# Patient Record
Sex: FEMALE | Race: WHITE
[De-identification: names, ages, dates, MRNs, and addresses within clinical notes are randomized per-mention and may not be internally consistent; named-entity substitution may affect disease eponyms.]

---

## 2018-11-07 ENCOUNTER — HOSPITAL ENCOUNTER (INPATIENT)
Dept: HOSPITAL 63 - GEROPSY | Age: 73
LOS: 12 days | Discharge: HOME | DRG: 885 | End: 2018-11-19
Attending: PSYCHIATRY & NEUROLOGY | Admitting: PSYCHIATRY & NEUROLOGY
Payer: COMMERCIAL

## 2018-11-07 VITALS — DIASTOLIC BLOOD PRESSURE: 87 MMHG | SYSTOLIC BLOOD PRESSURE: 146 MMHG

## 2018-11-07 VITALS — WEIGHT: 154.13 LBS | HEIGHT: 64 IN | BODY MASS INDEX: 26.31 KG/M2

## 2018-11-07 VITALS — DIASTOLIC BLOOD PRESSURE: 65 MMHG | SYSTOLIC BLOOD PRESSURE: 123 MMHG

## 2018-11-07 DIAGNOSIS — J44.9: ICD-10-CM

## 2018-11-07 DIAGNOSIS — F09: ICD-10-CM

## 2018-11-07 DIAGNOSIS — E86.0: ICD-10-CM

## 2018-11-07 DIAGNOSIS — E78.00: ICD-10-CM

## 2018-11-07 DIAGNOSIS — F42.9: ICD-10-CM

## 2018-11-07 DIAGNOSIS — E87.1: ICD-10-CM

## 2018-11-07 DIAGNOSIS — Z79.899: ICD-10-CM

## 2018-11-07 DIAGNOSIS — F31.64: Primary | ICD-10-CM

## 2018-11-07 DIAGNOSIS — F03.90: ICD-10-CM

## 2018-11-07 DIAGNOSIS — F90.9: ICD-10-CM

## 2018-11-07 DIAGNOSIS — F45.21: ICD-10-CM

## 2018-11-07 DIAGNOSIS — M48.00: ICD-10-CM

## 2018-11-07 DIAGNOSIS — I87.2: ICD-10-CM

## 2018-11-07 DIAGNOSIS — F31.2: ICD-10-CM

## 2018-11-07 DIAGNOSIS — I10: ICD-10-CM

## 2018-11-07 DIAGNOSIS — G89.29: ICD-10-CM

## 2018-11-07 DIAGNOSIS — M19.90: ICD-10-CM

## 2018-11-07 DIAGNOSIS — F60.0: ICD-10-CM

## 2018-11-07 DIAGNOSIS — F63.9: ICD-10-CM

## 2018-11-07 DIAGNOSIS — F41.0: ICD-10-CM

## 2018-11-07 LAB
ALBUMIN SERPL-MCNC: 3.3 G/DL (ref 3.4–5)
ALBUMIN/GLOB SERPL: 1.1 {RATIO} (ref 1–1.7)
ALP SERPL-CCNC: 73 U/L (ref 46–116)
ALT SERPL-CCNC: 39 U/L (ref 14–59)
ANION GAP SERPL CALC-SCNC: 7 MMOL/L (ref 6–14)
APTT PPP: YELLOW S
AST SERPL-CCNC: 19 U/L (ref 15–37)
BACTERIA #/AREA URNS HPF: (no result) /HPF
BASOPHILS # BLD AUTO: 0 X10^3/UL (ref 0–0.2)
BASOPHILS NFR BLD: 0 % (ref 0–3)
BILIRUB SERPL-MCNC: 0.3 MG/DL (ref 0.2–1)
BILIRUB UR QL STRIP: (no result)
BUN/CREAT SERPL: 27 (ref 6–20)
CA-I SERPL ISE-MCNC: 19 MG/DL (ref 7–20)
CALCIUM SERPL-MCNC: 9.2 MG/DL (ref 8.5–10.1)
CHLORIDE SERPL-SCNC: 104 MMOL/L (ref 98–107)
CHOLEST/HDLC SERPL: 3 {RATIO}
CO2 SERPL-SCNC: 27 MMOL/L (ref 21–32)
CREAT SERPL-MCNC: 0.7 MG/DL (ref 0.6–1)
EOSINOPHIL NFR BLD: 0.1 X10^3/UL (ref 0–0.7)
EOSINOPHIL NFR BLD: 3 % (ref 0–3)
ERYTHROCYTE [DISTWIDTH] IN BLOOD BY AUTOMATED COUNT: 12.3 % (ref 11.5–14.5)
FIBRINOGEN PPP-MCNC: CLEAR MG/DL
GFR SERPLBLD BASED ON 1.73 SQ M-ARVRAT: 82.7 ML/MIN
GLOBULIN SER-MCNC: 3.1 G/DL (ref 2.2–3.8)
GLUCOSE SERPL-MCNC: 99 MG/DL (ref 70–99)
GLUCOSE UR STRIP-MCNC: (no result) MG/DL
HBA1C MFR BLD: 5.6 % (ref 4.8–5.6)
HCT VFR BLD CALC: 41.2 % (ref 36–47)
HDLC SERPL-MCNC: 54 MG/DL (ref 40–60)
HGB BLD-MCNC: 14.1 G/DL (ref 12–15.5)
LDLC: 113 MG/DL (ref 0–100)
LYMPHOCYTES # BLD: 1.4 X10^3/UL (ref 1–4.8)
LYMPHOCYTES NFR BLD AUTO: 33 % (ref 24–48)
MAGNESIUM SERPL-MCNC: 2.2 MG/DL (ref 1.8–2.4)
MCH RBC QN AUTO: 33 PG (ref 25–35)
MCHC RBC AUTO-ENTMCNC: 34 G/DL (ref 31–37)
MCV RBC AUTO: 95 FL (ref 79–100)
MONO #: 0.4 X10^3/UL (ref 0–1.1)
MONOCYTES NFR BLD: 10 % (ref 0–9)
NEUT #: 2.3 X10^3UL (ref 1.8–7.7)
NEUTROPHILS NFR BLD AUTO: 54 % (ref 31–73)
NITRITE UR QL STRIP: (no result)
PLATELET # BLD AUTO: 265 X10^3/UL (ref 140–400)
POTASSIUM SERPL-SCNC: 3.6 MMOL/L (ref 3.5–5.1)
PROT SERPL-MCNC: 6.4 G/DL (ref 6.4–8.2)
RBC # BLD AUTO: 4.32 X10^6/UL (ref 3.5–5.4)
RBC #/AREA URNS HPF: 0 /HPF (ref 0–2)
SODIUM SERPL-SCNC: 138 MMOL/L (ref 136–145)
SP GR UR STRIP: 1.01
SQUAMOUS #/AREA URNS LPF: (no result) /LPF
T3 SERPL-MCNC: 115 NG/DL (ref 71–180)
T4 SERPL-MCNC: 6.5 UG/DL (ref 4.5–12)
THYROID STIM HORMONE (TSH): 1.69 UIU/ML (ref 0.36–3.74)
TRIGL SERPL-MCNC: 64 MG/DL (ref 0–150)
UROBILINOGEN UR-MCNC: 0.2 MG/DL
VLDLC: 12 MG/DL (ref 0–40)
WBC # BLD AUTO: 4.2 X10^3/UL (ref 4–11)
WBC #/AREA URNS HPF: (no result) /HPF (ref 0–4)

## 2018-11-07 PROCEDURE — 70450 CT HEAD/BRAIN W/O DYE: CPT

## 2018-11-07 PROCEDURE — 83550 IRON BINDING TEST: CPT

## 2018-11-07 PROCEDURE — 87880 STREP A ASSAY W/OPTIC: CPT

## 2018-11-07 PROCEDURE — 83735 ASSAY OF MAGNESIUM: CPT

## 2018-11-07 PROCEDURE — 80061 LIPID PANEL: CPT

## 2018-11-07 PROCEDURE — 71045 X-RAY EXAM CHEST 1 VIEW: CPT

## 2018-11-07 PROCEDURE — 36415 COLL VENOUS BLD VENIPUNCTURE: CPT

## 2018-11-07 PROCEDURE — 84436 ASSAY OF TOTAL THYROXINE: CPT

## 2018-11-07 PROCEDURE — 70486 CT MAXILLOFACIAL W/O DYE: CPT

## 2018-11-07 PROCEDURE — 82607 VITAMIN B-12: CPT

## 2018-11-07 PROCEDURE — 85025 COMPLETE CBC W/AUTO DIFF WBC: CPT

## 2018-11-07 PROCEDURE — 80053 COMPREHEN METABOLIC PANEL: CPT

## 2018-11-07 PROCEDURE — 93005 ELECTROCARDIOGRAM TRACING: CPT

## 2018-11-07 PROCEDURE — 80164 ASSAY DIPROPYLACETIC ACD TOT: CPT

## 2018-11-07 PROCEDURE — 83880 ASSAY OF NATRIURETIC PEPTIDE: CPT

## 2018-11-07 PROCEDURE — 94640 AIRWAY INHALATION TREATMENT: CPT

## 2018-11-07 PROCEDURE — 83036 HEMOGLOBIN GLYCOSYLATED A1C: CPT

## 2018-11-07 PROCEDURE — 82306 VITAMIN D 25 HYDROXY: CPT

## 2018-11-07 PROCEDURE — 87070 CULTURE OTHR SPECIMN AEROBIC: CPT

## 2018-11-07 PROCEDURE — 80048 BASIC METABOLIC PNL TOTAL CA: CPT

## 2018-11-07 PROCEDURE — 86592 SYPHILIS TEST NON-TREP QUAL: CPT

## 2018-11-07 PROCEDURE — 81001 URINALYSIS AUTO W/SCOPE: CPT

## 2018-11-07 PROCEDURE — 84443 ASSAY THYROID STIM HORMONE: CPT

## 2018-11-07 PROCEDURE — 71046 X-RAY EXAM CHEST 2 VIEWS: CPT

## 2018-11-07 PROCEDURE — 83540 ASSAY OF IRON: CPT

## 2018-11-07 PROCEDURE — 84480 ASSAY TRIIODOTHYRONINE (T3): CPT

## 2018-11-07 RX ADMIN — ACETAMINOPHEN SCH MG: 325 TABLET, FILM COATED ORAL at 20:46

## 2018-11-07 RX ADMIN — QUETIAPINE FUMARATE SCH MG: 25 TABLET, FILM COATED ORAL at 10:00

## 2018-11-07 RX ADMIN — QUETIAPINE FUMARATE SCH MG: 25 TABLET, FILM COATED ORAL at 20:45

## 2018-11-07 RX ADMIN — MELATONIN TAB 3 MG SCH MG: 3 TAB at 20:46

## 2018-11-07 RX ADMIN — LISINOPRIL SCH MG: 20 TABLET ORAL at 10:04

## 2018-11-07 NOTE — RAD
EXAM: Head CT without contrast.

 

HISTORY: Altered mental status.

 

TECHNIQUE: Computed tomographic images of the head were obtained without 

contrast. 

*One or more of the following individualized dose reduction techniques 

were utilized for this examination:  

1. Automated exposure control.  

2. Adjustment of the mA and/or kV according to patient size.  

3. Use of iterative reconstruction technique.

 

COMPARISON: None.

 

FINDINGS: There is no acute or subacute extra-axial or intraparenchymal 

hemorrhage. There is no mass effect or midline shift. There is no 

hydrocephalus. 

 

There are areas of decreased attenuation within the cerebral white matter,

nonspecific and likely related to chronic small vessel disease. There is 

cerebral atrophy with compensatory enlargement of the ventricles. There is

asymmetric dilatation of the occipital horn of the right lateral 

ventricle.

 

The visualized portions of the orbits, paranasal sinuses and mastoid air 

cells are unremarkable. No suspicious calvarial lesion is seen.

 

IMPRESSION:

1. No acute intracranial finding. Note is made that MRI is more sensitive 

for acute infarction.

2. Subtle areas of hypodensity within the cerebral white matter, likely 

due to chronic small vessel disease.

 

Electronically signed by: Bev Harrison MD (11/7/2018 5:33 PM) [facility]

## 2018-11-07 NOTE — PDOC
Exam


Note:


Anjum Note:


Please also refer to the separate dictated note~for this date of service 

dictated separately.~Patient seen individually. Discussed the patient with 

Nursing staff reviewed the chart.~Reviewed interim history and current 

functioning. Reviewed vital signs,~Labs/ Radiology~and current medications 

noted below. Continue current treatment with the changes noted in the dictated 

addendum note





Assessment:


Vital Signs:





 Vital Signs








  Date Time  Temp Pulse Resp B/P (MAP) Pulse Ox O2 Delivery O2 Flow Rate FiO2


 


11/7/18 21:00 96.5       


 


11/7/18 10:04  61  123/65    


 


11/7/18 06:05   16  99   








Labs:





 Laboratory Tests








Test


 11/7/18


06:35 11/7/18


06:46


 


White Blood Count


 4.2 x10^3/uL


(4.0-11.0) 





 


Red Blood Count


 4.32 x10^6/uL


(3.50-5.40) 





 


Hemoglobin


 14.1 g/dL


(12.0-15.5) 





 


Hematocrit


 41.2 %


(36.0-47.0) 





 


Mean Corpuscular Volume


 95 fL ()


 





 


Mean Corpuscular Hemoglobin 33 pg (25-35)   


 


Mean Corpuscular Hemoglobin


Concent 34 g/dL


(31-37) 





 


Red Cell Distribution Width


 12.3 %


(11.5-14.5) 





 


Platelet Count


 265 x10^3/uL


(140-400) 





 


Neutrophils (%) (Auto) 54 % (31-73)   


 


Lymphocytes (%) (Auto) 33 % (24-48)   


 


Monocytes (%) (Auto) 10 % (0-9)  H 


 


Eosinophils (%) (Auto) 3 % (0-3)   


 


Basophils (%) (Auto) 0 % (0-3)   


 


Neutrophils # (Auto)


 2.3 x10^3uL


(1.8-7.7) 





 


Lymphocytes # (Auto)


 1.4 x10^3/uL


(1.0-4.8) 





 


Monocytes # (Auto)


 0.4 x10^3/uL


(0.0-1.1) 





 


Eosinophils # (Auto)


 0.1 x10^3/uL


(0.0-0.7) 





 


Basophils # (Auto)


 0.0 x10^3/uL


(0.0-0.2) 





 


Sodium Level


 138 mmol/L


(136-145) 





 


Potassium Level


 3.6 mmol/L


(3.5-5.1) 





 


Chloride Level


 104 mmol/L


() 





 


Carbon Dioxide Level


 27 mmol/L


(21-32) 





 


Anion Gap 7 (6-14)   


 


Blood Urea Nitrogen


 19 mg/dL


(7-20) 





 


Creatinine


 0.7 mg/dL


(0.6-1.0) 





 


Estimated GFR


(Cockcroft-Gault) 82.7  


 





 


BUN/Creatinine Ratio 27 (6-20)  H 


 


Glucose Level


 99 mg/dL


(70-99) 





 


Hemoglobin A1c


 5.6 %


(4.8-5.6) 





 


Calcium Level


 9.2 mg/dL


(8.5-10.1) 





 


Magnesium Level


 2.2 mg/dL


(1.8-2.4) 





 


Iron Level


 57 ug/dL


() 





 


Total Iron Binding Capacity


 255 ug/dL


(250-450) 





 


Iron Saturation 22 % (15-34)   


 


Total Bilirubin


 0.3 mg/dL


(0.2-1.0) 





 


Aspartate Amino Transferase


(AST) 19 U/L (15-37)


 





 


Alanine Aminotransferase (ALT)


 39 U/L (14-59)


 





 


Alkaline Phosphatase


 73 U/L


() 





 


Total Protein


 6.4 g/dL


(6.4-8.2) 





 


Albumin


 3.3 g/dL


(3.4-5.0)  L 





 


Albumin/Globulin Ratio 1.1 (1.0-1.7)   


 


Triglycerides Level


 64 mg/dL


(0-150) 





 


Cholesterol Level


 179 mg/dL


(0-200) 





 


LDL Cholesterol, Calculated


 113 mg/dL


(0-100)  H 





 


VLDL Cholesterol, Calculated


 12 mg/dL


(0-40) 





 


Non-HDL Cholesterol Calculated


 125 mg/dL


(0-129) 





 


HDL Cholesterol


 54 mg/dL


(40-60) 





 


Cholesterol/HDL Ratio 3.0   


 


Vitamin B12 Level


 778 pg/mL


(247-911) 





 


25-Hydroxy Vitamin D Total


 49.2 ng/mL


() 





 


Thyroid Stimulating Hormone


(TSH) 1.693 uIU/mL


(0.358-3.740) 





 


Thyroxine (T4)


 6.5 ug/dL


(4.5-12.0) 





 


Total Triiodothyronine (TT3)


 115 ng/dL


() 





 


Treponema pallidum Antibody


 Nonreactive


(Nonreactive) 





 


Urine Collection Type  Unknown  


 


Urine Color  Yellow  


 


Urine Clarity  Clear  


 


Urine pH  6.0  


 


Urine Specific Gravity  1.010  


 


Urine Protein


 


 Neg


(NEG-TRACE)


 


Urine Glucose (UA)


 


 Neg mg/dL


(NEG)


 


Urine Ketones (Stick)


 


 Neg mg/dL


(NEG)


 


Urine Blood  Neg (NEG)  


 


Urine Nitrite  Neg (NEG)  


 


Urine Bilirubin  Neg (NEG)  


 


Urine Urobilinogen Dipstick


 


 0.2 mg/dL (0.2


mg/dL)


 


Urine Leukocyte Esterase  Neg (NEG)  


 


Urine RBC  0 /HPF (0-2)  


 


Urine WBC


 


 Occ /HPF (0-4)





 


Urine Squamous Epithelial


Cells 


 Occ /LPF  





 


Urine Bacteria


 


 Few /HPF


(0-FEW)











Current Medications:


Meds:





Current Medications


Acetaminophen (Tylenol) 650 mg PRN Q6HRS  PRN PO PAIN / TEMP;  Start 11/7/18 at 

02:15


Multi-Ingredient Ointment (Analgesic Balm) 1 madalyn PRN QID  PRN TP MUSCLE PAIN;  

Start 11/7/18 at 02:15


Al Hydroxide/Mg Hydroxide (Mylanta Plus Xs) 15 ml PRN AFTMEALHC  PRN PO 

DYSPEPSIA;  Start 11/7/18 at 02:15


Magnesium Hydroxide (Milk Of Magnesia) 2,400 mg PRN QHS  PRN PO CONSTIPATION;  

Start 11/7/18 at 02:15


Albuterol Sulfate (Ventolin Hfa Inhaler) 2 puff PRN Q4HRS  PRN INH SHORTNESS OF 

BREATH;  Start 11/7/18 at 09:45;  Stop 11/7/18 at 11:04;  Status DC


Clonazepam (KlonoPIN) 0.25 mg PRN DAILY  PRN PO ANXIETY Last administered on 11/ 7/18at 12:50;  Start 11/7/18 at 09:45


Vitamin D (Vitamin D3) 1,000 unit DAILY PO ;  Start 11/8/18 at 09:00


Lisinopril (Prinivil) 20 mg DAILY PO  Last administered on 11/7/18at 10:04;  

Start 11/7/18 at 10:00


Melatonin 9 mg QHS PO  Last administered on 11/7/18at 20:46;  Start 11/7/18 at 

21:00


Multivitamins/ Calcium (Thera-M Plus) 1 tab DAILY PO ;  Start 11/8/18 at 09:00


Quetiapine Fumarate (SEROquel) 25 mg QHS PO  Last administered on 11/7/18at 20:

45;  Start 11/7/18 at 10:00


Acetaminophen (Tylenol) 500 mg BID PO  Last administered on 11/7/18at 20:46;  

Start 11/7/18 at 21:00


Hydrochlorothiazide (Microzide) 12.5 mg DAILY PO  Last administered on 11/7/ 18at 10:03;  Start 11/7/18 at 10:00


Albuterol Sulfate (Ventolin) 2.5 mg PRN Q4HRS  PRN NEB SHORTNESS OF BREATH;  

Start 11/7/18 at 11:00


Olanzapine (ZyPREXA ZYDIS) 2.5 mg PRN Q2HR  PRN PO PSYCHOSIS;  Start 11/7/18 at 

19:30





Active Scripts


Active


Reported


Centrum Adults Tablet (Multivitamin/Iron/Folic Acid) 1 Each Tablet 1 Each PO 

DAILY


Vitamin D (Cholecalciferol (Vitamin D3)) 1,000 Unit Capsule 1,000 Unit PO DAILY


Proair Hfa Inhaler (Albuterol Sulfate) 8.5 Gm Hfa.aer.ad 2 Puff INH PRN Q4HRS 

PRN


Tylenol (Acetaminophen) 325 Mg Tablet 500 Mg PO BID


Clonazepam 0.5 Mg Tablet 0.25 Mg PO PRN DAILY PRN


Melatonin 3 Mg Tablet 10 Mg PO QHS


Seroquel (Quetiapine Fumarate) 25 Mg Tablet 25 Mg PO QHS


Lisinopril-Hctz 20-12.5 Mg Tab (Lisinopril/Hydrochlorothiazide) 1 Each Tablet 1 

Tab PO DAILY


I have reviewed the current psychotropics carefully including drug 

interactions.  Risk benefit ratio favors no change other than as noted in my 

dictated progress note.











SOFÍA MANN MD Nov 7, 2018 23:20

## 2018-11-07 NOTE — PDOC
Exam


Note:


Anjum Note:


Late entry for DOS 11/6/2018. Please also refer to the separate dictated note~

for this date of service dictated separately.~Discussed the patient with 

Nursing staff reviewed the chart.~Reviewed interim history and current 

functioning. Reviewed vital signs,~Labs/ Radiology~and current medications 

noted below. Continue current treatment with the changes noted in the dictated 

addendum note





Assessment:


Vital Signs:





 VS - Last 72 Hours, by Label








  Date Time  Temp Pulse Resp B/P (MAP) Pulse Ox O2 Delivery O2 Flow Rate FiO2


 


11/7/18 10:04  61  123/65    


 


11/7/18 06:05 96.5 61 16 123/65 (84) 99   


 


11/7/18 01:48 97.5 76 18 146/87 (106) 96   








 Vital Signs








  Date Time  Temp Pulse Resp B/P (MAP) Pulse Ox O2 Delivery O2 Flow Rate FiO2


 


11/7/18 10:04  61  123/65    


 


11/7/18 06:05 96.5  16  99   








Labs:





 Laboratory Tests








Test


 11/7/18


06:35 11/7/18


06:46


 


White Blood Count


 4.2 x10^3/uL


(4.0-11.0) 





 


Red Blood Count


 4.32 x10^6/uL


(3.50-5.40) 





 


Hemoglobin


 14.1 g/dL


(12.0-15.5) 





 


Hematocrit


 41.2 %


(36.0-47.0) 





 


Mean Corpuscular Volume


 95 fL ()


 





 


Mean Corpuscular Hemoglobin 33 pg (25-35)   


 


Mean Corpuscular Hemoglobin


Concent 34 g/dL


(31-37) 





 


Red Cell Distribution Width


 12.3 %


(11.5-14.5) 





 


Platelet Count


 265 x10^3/uL


(140-400) 





 


Neutrophils (%) (Auto) 54 % (31-73)   


 


Lymphocytes (%) (Auto) 33 % (24-48)   


 


Monocytes (%) (Auto) 10 % (0-9)  H 


 


Eosinophils (%) (Auto) 3 % (0-3)   


 


Basophils (%) (Auto) 0 % (0-3)   


 


Neutrophils # (Auto)


 2.3 x10^3uL


(1.8-7.7) 





 


Lymphocytes # (Auto)


 1.4 x10^3/uL


(1.0-4.8) 





 


Monocytes # (Auto)


 0.4 x10^3/uL


(0.0-1.1) 





 


Eosinophils # (Auto)


 0.1 x10^3/uL


(0.0-0.7) 





 


Basophils # (Auto)


 0.0 x10^3/uL


(0.0-0.2) 





 


Sodium Level


 138 mmol/L


(136-145) 





 


Potassium Level


 3.6 mmol/L


(3.5-5.1) 





 


Chloride Level


 104 mmol/L


() 





 


Carbon Dioxide Level


 27 mmol/L


(21-32) 





 


Anion Gap 7 (6-14)   


 


Blood Urea Nitrogen


 19 mg/dL


(7-20) 





 


Creatinine


 0.7 mg/dL


(0.6-1.0) 





 


Estimated GFR


(Cockcroft-Gault) 82.7  


 





 


BUN/Creatinine Ratio 27 (6-20)  H 


 


Glucose Level


 99 mg/dL


(70-99) 





 


Hemoglobin A1c


 5.6 %


(4.8-5.6) 





 


Calcium Level


 9.2 mg/dL


(8.5-10.1) 





 


Magnesium Level


 2.2 mg/dL


(1.8-2.4) 





 


Iron Level


 57 ug/dL


() 





 


Total Iron Binding Capacity


 255 ug/dL


(250-450) 





 


Iron Saturation 22 % (15-34)   


 


Total Bilirubin


 0.3 mg/dL


(0.2-1.0) 





 


Aspartate Amino Transferase


(AST) 19 U/L (15-37)


 





 


Alanine Aminotransferase (ALT)


 39 U/L (14-59)


 





 


Alkaline Phosphatase


 73 U/L


() 





 


Total Protein


 6.4 g/dL


(6.4-8.2) 





 


Albumin


 3.3 g/dL


(3.4-5.0)  L 





 


Albumin/Globulin Ratio 1.1 (1.0-1.7)   


 


Triglycerides Level


 64 mg/dL


(0-150) 





 


Cholesterol Level


 179 mg/dL


(0-200) 





 


LDL Cholesterol, Calculated


 113 mg/dL


(0-100)  H 





 


VLDL Cholesterol, Calculated


 12 mg/dL


(0-40) 





 


Non-HDL Cholesterol Calculated


 125 mg/dL


(0-129) 





 


HDL Cholesterol


 54 mg/dL


(40-60) 





 


Cholesterol/HDL Ratio 3.0   


 


Vitamin B12 Level


 778 pg/mL


(247-911) 





 


25-Hydroxy Vitamin D Total


 49.2 ng/mL


() 





 


Thyroid Stimulating Hormone


(TSH) 1.693 uIU/mL


(0.358-3.740) 





 


Thyroxine (T4)


 6.5 ug/dL


(4.5-12.0) 





 


Total Triiodothyronine (TT3)


 115 ng/dL


() 





 


Treponema pallidum Antibody


 Nonreactive


(Nonreactive) 





 


Urine Collection Type  Unknown  


 


Urine Color  Yellow  


 


Urine Clarity  Clear  


 


Urine pH  6.0  


 


Urine Specific Gravity  1.010  


 


Urine Protein


 


 Neg


(NEG-TRACE)


 


Urine Glucose (UA)


 


 Neg mg/dL


(NEG)


 


Urine Ketones (Stick)


 


 Neg mg/dL


(NEG)


 


Urine Blood  Neg (NEG)  


 


Urine Nitrite  Neg (NEG)  


 


Urine Bilirubin  Neg (NEG)  


 


Urine Urobilinogen Dipstick


 


 0.2 mg/dL (0.2


mg/dL)


 


Urine Leukocyte Esterase  Neg (NEG)  


 


Urine RBC  0 /HPF (0-2)  


 


Urine WBC


 


 Occ /HPF (0-4)





 


Urine Squamous Epithelial


Cells 


 Occ /LPF  





 


Urine Bacteria


 


 Few /HPF


(0-FEW)











Current Medications:


Meds:





Current Medications


Acetaminophen (Tylenol) 650 mg PRN Q6HRS  PRN PO PAIN / TEMP;  Start 11/7/18 at 

02:15


Multi-Ingredient Ointment (Analgesic Balm) 1 madalyn PRN QID  PRN TP MUSCLE PAIN;  

Start 11/7/18 at 02:15


Al Hydroxide/Mg Hydroxide (Mylanta Plus Xs) 15 ml PRN AFTMEALHC  PRN PO 

DYSPEPSIA;  Start 11/7/18 at 02:15


Magnesium Hydroxide (Milk Of Magnesia) 2,400 mg PRN QHS  PRN PO CONSTIPATION;  

Start 11/7/18 at 02:15


Albuterol Sulfate (Ventolin Hfa Inhaler) 2 puff PRN Q4HRS  PRN INH SHORTNESS OF 

BREATH;  Start 11/7/18 at 09:45;  Stop 11/7/18 at 11:04;  Status DC


Clonazepam (KlonoPIN) 0.25 mg PRN DAILY  PRN PO ANXIETY Last administered on 11/ 7/18at 12:50;  Start 11/7/18 at 09:45


Vitamin D (Vitamin D3) 1,000 unit DAILY PO ;  Start 11/8/18 at 09:00


Lisinopril (Prinivil) 20 mg DAILY PO  Last administered on 11/7/18at 10:04;  

Start 11/7/18 at 10:00


Melatonin 9 mg QHS PO  Last administered on 11/7/18at 20:46;  Start 11/7/18 at 

21:00


Multivitamins/ Calcium (Thera-M Plus) 1 tab DAILY PO ;  Start 11/8/18 at 09:00


Quetiapine Fumarate (SEROquel) 25 mg QHS PO  Last administered on 11/7/18at 20:

45;  Start 11/7/18 at 10:00


Acetaminophen (Tylenol) 500 mg BID PO  Last administered on 11/7/18at 20:46;  

Start 11/7/18 at 21:00


Hydrochlorothiazide (Microzide) 12.5 mg DAILY PO  Last administered on 11/7/ 18at 10:03;  Start 11/7/18 at 10:00


Albuterol Sulfate (Ventolin) 2.5 mg PRN Q4HRS  PRN NEB SHORTNESS OF BREATH;  

Start 11/7/18 at 11:00


Olanzapine (ZyPREXA ZYDIS) 2.5 mg PRN Q2HR  PRN PO PSYCHOSIS;  Start 11/7/18 at 

19:30





Active Scripts


Active


Reported


Centrum Adults Tablet (Multivitamin/Iron/Folic Acid) 1 Each Tablet 1 Each PO 

DAILY


Vitamin D (Cholecalciferol (Vitamin D3)) 1,000 Unit Capsule 1,000 Unit PO DAILY


Proair Hfa Inhaler (Albuterol Sulfate) 8.5 Gm Hfa.aer.ad 2 Puff INH PRN Q4HRS 

PRN


Tylenol (Acetaminophen) 325 Mg Tablet 500 Mg PO BID


Clonazepam 0.5 Mg Tablet 0.25 Mg PO PRN DAILY PRN


Melatonin 3 Mg Tablet 10 Mg PO QHS


Seroquel (Quetiapine Fumarate) 25 Mg Tablet 25 Mg PO QHS


Lisinopril-Hctz 20-12.5 Mg Tab (Lisinopril/Hydrochlorothiazide) 1 Each Tablet 1 

Tab PO DAILY


I have reviewed the current psychotropics carefully including drug 

interactions.  Risk benefit ratio favors no change other than as noted in my 

dictated progress note.











SOFÍA MANN MD Nov 7, 2018 22:52

## 2018-11-08 VITALS — SYSTOLIC BLOOD PRESSURE: 144 MMHG | DIASTOLIC BLOOD PRESSURE: 84 MMHG

## 2018-11-08 VITALS — SYSTOLIC BLOOD PRESSURE: 119 MMHG | DIASTOLIC BLOOD PRESSURE: 82 MMHG

## 2018-11-08 RX ADMIN — VITAMIN D, TAB 1000IU (100/BT) SCH UNIT: 25 TAB at 08:11

## 2018-11-08 RX ADMIN — Medication PRN APP: at 14:33

## 2018-11-08 RX ADMIN — MELATONIN TAB 3 MG SCH MG: 3 TAB at 19:35

## 2018-11-08 RX ADMIN — Medication SCH EA: at 21:00

## 2018-11-08 RX ADMIN — DIVALPROEX SODIUM SCH MG: 500 TABLET, FILM COATED, EXTENDED RELEASE ORAL at 08:13

## 2018-11-08 RX ADMIN — ACETAMINOPHEN SCH MG: 325 TABLET, FILM COATED ORAL at 19:34

## 2018-11-08 RX ADMIN — MULTIPLE VITAMINS W/ MINERALS TAB SCH TAB: TAB at 08:12

## 2018-11-08 RX ADMIN — DIVALPROEX SODIUM SCH MG: 500 TABLET, FILM COATED, EXTENDED RELEASE ORAL at 08:12

## 2018-11-08 RX ADMIN — ACETAMINOPHEN SCH MG: 325 TABLET, FILM COATED ORAL at 08:08

## 2018-11-08 RX ADMIN — LISINOPRIL SCH MG: 20 TABLET ORAL at 08:07

## 2018-11-08 NOTE — PDOC2
CONSULT


Date of Admission


DATE: 11/8/18 


TIME: 20:46


Reason for Consult:


Medical Management


Referring Physician:


Dr Cuadra


Chief Complaint


mood disorder


Source:  Caregiver, Chart review, Patient


History of Present Illness:


72/F referred to [facility] unit for admission by  at [facility] ED after 

having been taken there for evaluation by her  for psychiatric 

evaluation.  Records indicate patient has been emotionally labile and paranoid, 

very anxious and obviously manic.  Symptoms have been worsening since late 

October, patient has reportedly undergone neuropsychiatric testing and 

pharmacologic therapies as outpatient which have been unsuccessful.


I evaluated her assisted by [facility] RN, and the patient exhibited very pressured 

speech with tangential thoughts and hypochondriasis.  Patient complains of sore 

throat described as mild with no associated symptoms since July.  Requests 

lidocaine patch for her chronic back pain.  She feels that she's been inactive 

recently and her joints are stiff, she feels a physical therapy  evaluation 

would benefit her.  She wants constant throat lozenges.  Many other somatic 

complaints, none of which involve HA/fever/CP/SOB/n/v/d/focal neurodeficit.  


Her vitals have been stable, labs unremarkable aside from slightly 

subtherapeutic valproic acid level at 45.  


She is ambulatory with antalgic gait in no apparent distress.


Cardiovascular:  HTN


Psych:  Anxiety


Musculoskeletal:   low back pain (Chronic)


Past Surgical History


lumbar "back surgeries"


Smoke:  No


ALCOHOL:  none


Drugs:  None


Lives:  with Family


Domestic Violence:  Neg


Current Medications





Current Medications


Acetaminophen (Tylenol) 650 mg PRN Q6HRS  PRN PO PAIN / TEMP;  Start 11/7/18 at 

02:15


Multi-Ingredient Ointment (Analgesic Balm) 1 madalyn PRN QID  PRN TP MUSCLE PAIN 

Last administered on 11/8/18at 14:33;  Start 11/7/18 at 02:15


Al Hydroxide/Mg Hydroxide (Mylanta Plus Xs) 15 ml PRN AFTMEALHC  PRN PO 

DYSPEPSIA;  Start 11/7/18 at 02:15


Magnesium Hydroxide (Milk Of Magnesia) 2,400 mg PRN QHS  PRN PO CONSTIPATION;  

Start 11/7/18 at 02:15


Albuterol Sulfate (Ventolin Hfa Inhaler) 2 puff PRN Q4HRS  PRN INH SHORTNESS OF 

BREATH;  Start 11/7/18 at 09:45;  Stop 11/7/18 at 11:04;  Status DC


Clonazepam (KlonoPIN) 0.25 mg PRN DAILY  PRN PO ANXIETY Last administered on 11/ 7/18at 12:50;  Start 11/7/18 at 09:45


Vitamin D (Vitamin D3) 1,000 unit DAILY PO  Last administered on 11/8/18at 08:11

;  Start 11/8/18 at 09:00


Lisinopril (Prinivil) 20 mg DAILY PO  Last administered on 11/8/18at 08:07;  

Start 11/7/18 at 10:00


Melatonin 9 mg QHS PO  Last administered on 11/8/18at 19:35;  Start 11/7/18 at 

21:00


Multivitamins/ Calcium (Thera-M Plus) 1 tab DAILY PO  Last administered on 11/8/ 18at 08:12;  Start 11/8/18 at 09:00


Quetiapine Fumarate (SEROquel) 25 mg QHS PO  Last administered on 11/7/18at 20:

45;  Start 11/7/18 at 10:00;  Stop 11/8/18 at 07:23;  Status DC


Acetaminophen (Tylenol) 500 mg BID PO  Last administered on 11/8/18at 19:34;  

Start 11/7/18 at 21:00


Hydrochlorothiazide (Microzide) 12.5 mg DAILY PO  Last administered on 11/8/ 18at 08:07;  Start 11/7/18 at 10:00


Albuterol Sulfate (Ventolin) 2.5 mg PRN Q4HRS  PRN NEB SHORTNESS OF BREATH;  

Start 11/7/18 at 11:00


Olanzapine (ZyPREXA ZYDIS) 2.5 mg PRN Q2HR  PRN PO PSYCHOSIS Last administered 

on 11/8/18at 02:39;  Start 11/7/18 at 19:30


Divalproex Sodium (Depakote Er) 500 mg DAILY PO  Last administered on 11/8/18at 

08:13;  Start 11/8/18 at 08:00;  Stop 11/8/18 at 19:16;  Status DC


Risperidone (RisperDAL) 0.25 mg DAILY PO  Last administered on 11/8/18at 08:11;

  Start 11/8/18 at 09:00;  Stop 11/8/18 at 19:16;  Status DC


Divalproex Sodium (Depakote Er) 500 mg QHS PO ;  Start 11/9/18 at 21:00


Risperidone (RisperDAL) 0.25 mg QHS PO ;  Start 11/9/18 at 21:00





Active Scripts


Active


Reported


Centrum Adults Tablet (Multivitamin/Iron/Folic Acid) 1 Each Tablet 1 Each PO 

DAILY


Vitamin D (Cholecalciferol (Vitamin D3)) 1,000 Unit Capsule 1,000 Unit PO DAILY


Proair Hfa Inhaler (Albuterol Sulfate) 8.5 Gm Hfa.aer.ad 2 Puff INH PRN Q4HRS 

PRN


Tylenol (Acetaminophen) 325 Mg Tablet 500 Mg PO BID


Clonazepam 0.5 Mg Tablet 0.25 Mg PO PRN DAILY PRN


Melatonin 3 Mg Tablet 10 Mg PO QHS


Seroquel (Quetiapine Fumarate) 25 Mg Tablet 25 Mg PO QHS


Lisinopril-Hctz 20-12.5 Mg Tab (Lisinopril/Hydrochlorothiazide) 1 Each Tablet 1 

Tab PO DAILY


Allergies:  


Coded Allergies:  


     morphine (Verified  Allergy, Intermediate, 11/9/18)


Review of Systems:


Constitutional: No fever or chills


Eyes: No eye pain or blurred vision


Skin: No rash or itching


Cardiovascular: No chest pain, syncope, palpitations, dyspnea on exertion, or 

edema


Respiratory: No cough or difficulty breathing


Gastrointestinal: No nausea, vomiting, or abdominal pain


Neurologic: No headaches or focal neurologic deficits


Endocrine: No heat or cold intolerance


Genitourinary: No incontinence or hematuria


Musculoskeletal: No joint pain or swelling


Lymphatics: No enlarged lymph nodes


Psychiatric: No anxiety or depression


Physical Exam:


Gen.: Alert, pleasant, no apparent distress


HEENT: Normocephalic atraumatic, PERRLA EOMI, no scleral icterus, oral mucosa 

pink and moist


Neck: Supple, no lymphadenopathy, nontender


Cardiovascular: Normal S1 and S2 no murmurs


Pulmonary: Lungs are clear bilaterally with good air movement no respiratory 

distress


Abdomen: Soft nontender non-distended, bowel sounds present no masses


Extremities: No clubbing, cyanosis or edema


Neuro: Alert and oriented 3, cranial nerves II through XII grossly intact, no 

lateralizing neuro deficits


Skin: Warm, dry


VITALS





Vital Signs








  Date Time  Temp Pulse Resp B/P (MAP) Pulse Ox O2 Delivery O2 Flow Rate FiO2


 


11/8/18 16:03 97.7 83 18 119/82 (94) 100 Room Air  








Labs





Laboratory Tests








Test


 11/7/18


06:35 11/7/18


06:46


 


White Blood Count


 4.2 x10^3/uL


(4.0-11.0) 





 


Red Blood Count


 4.32 x10^6/uL


(3.50-5.40) 





 


Hemoglobin


 14.1 g/dL


(12.0-15.5) 





 


Hematocrit


 41.2 %


(36.0-47.0) 





 


Mean Corpuscular Volume 95 fL ()  


 


Mean Corpuscular Hemoglobin 33 pg (25-35)  


 


Mean Corpuscular Hemoglobin


Concent 34 g/dL


(31-37) 





 


Red Cell Distribution Width


 12.3 %


(11.5-14.5) 





 


Platelet Count


 265 x10^3/uL


(140-400) 





 


Neutrophils (%) (Auto) 54 % (31-73)  


 


Lymphocytes (%) (Auto) 33 % (24-48)  


 


Monocytes (%) (Auto) 10 % (0-9)  


 


Eosinophils (%) (Auto) 3 % (0-3)  


 


Basophils (%) (Auto) 0 % (0-3)  


 


Neutrophils # (Auto)


 2.3 x10^3uL


(1.8-7.7) 





 


Lymphocytes # (Auto)


 1.4 x10^3/uL


(1.0-4.8) 





 


Monocytes # (Auto)


 0.4 x10^3/uL


(0.0-1.1) 





 


Eosinophils # (Auto)


 0.1 x10^3/uL


(0.0-0.7) 





 


Basophils # (Auto)


 0.0 x10^3/uL


(0.0-0.2) 





 


Sodium Level


 138 mmol/L


(136-145) 





 


Potassium Level


 3.6 mmol/L


(3.5-5.1) 





 


Chloride Level


 104 mmol/L


() 





 


Carbon Dioxide Level


 27 mmol/L


(21-32) 





 


Anion Gap 7 (6-14)  


 


Blood Urea Nitrogen


 19 mg/dL


(7-20) 





 


Creatinine


 0.7 mg/dL


(0.6-1.0) 





 


Estimated GFR


(Cockcroft-Gault) 82.7 


 





 


BUN/Creatinine Ratio 27 (6-20)  


 


Glucose Level


 99 mg/dL


(70-99) 





 


Hemoglobin A1c


 5.6 %


(4.8-5.6) 





 


Calcium Level


 9.2 mg/dL


(8.5-10.1) 





 


Magnesium Level


 2.2 mg/dL


(1.8-2.4) 





 


Iron Level


 57 ug/dL


() 





 


Total Iron Binding Capacity


 255 ug/dL


(250-450) 





 


Iron Saturation 22 % (15-34)  


 


Total Bilirubin


 0.3 mg/dL


(0.2-1.0) 





 


Aspartate Amino Transf


(AST/SGOT) 19 U/L (15-37) 


 





 


Alanine Aminotransferase


(ALT/SGPT) 39 U/L (14-59) 


 





 


Alkaline Phosphatase


 73 U/L


() 





 


Total Protein


 6.4 g/dL


(6.4-8.2) 





 


Albumin


 3.3 g/dL


(3.4-5.0) 





 


Albumin/Globulin Ratio 1.1 (1.0-1.7)  


 


Triglycerides Level


 64 mg/dL


(0-150) 





 


Cholesterol Level


 179 mg/dL


(0-200) 





 


LDL Cholesterol, Calculated


 113 mg/dL


(0-100) 





 


VLDL Cholesterol, Calculated


 12 mg/dL


(0-40) 





 


Non-HDL Cholesterol Calculated


 125 mg/dL


(0-129) 





 


HDL Cholesterol


 54 mg/dL


(40-60) 





 


Cholesterol/HDL Ratio 3.0  


 


Vitamin B12 Level


 778 pg/mL


(247-911) 





 


25-Hydroxy Vitamin D Total


 49.2 ng/mL


() 





 


Thyroid Stimulating Hormone


(TSH) 1.693 uIU/mL


(0.358-3.740) 





 


Thyroxine (T4)


 6.5 ug/dL


(4.5-12.0) 





 


Total Triiodothyronine


 115 ng/dL


() 





 


Treponema pallidum Antibody


 Nonreactive


(Nonreactive) 





 


Urine Collection Type  Unknown 


 


Urine Color  Yellow 


 


Urine Clarity  Clear 


 


Urine pH  6.0 


 


Urine Specific Gravity  1.010 


 


Urine Protein


 


 Neg


(NEG-TRACE)


 


Urine Glucose (UA)


 


 Neg mg/dL


(NEG)


 


Urine Ketones (Stick)


 


 Neg mg/dL


(NEG)


 


Urine Blood  Neg (NEG) 


 


Urine Nitrite  Neg (NEG) 


 


Urine Bilirubin  Neg (NEG) 


 


Urine Urobilinogen Dipstick


 


 0.2 mg/dL (0.2


mg/dL)


 


Urine Leukocyte Esterase  Neg (NEG) 


 


Urine RBC  0 /HPF (0-2) 


 


Urine WBC  Occ /HPF (0-4) 


 


Urine Squamous Epithelial


Cells 


 Occ /LPF 





 


Urine Bacteria


 


 Few /HPF


(0-FEW)








Assessment/Plan


This is a 72/F patient referred for H admission from  at [facility] ED 

for worsening moods and behaviors very concerning to her spouse.  Based upon 

available vitals and labs, she is currently medically stable.  Due to her 

complaints will check rapid strep test and offer PT evaluation.  As lidocaine 

patches have provided relief for her in the past will order for symptomatic 

relief.  We will continue to follow and offer treatments as indicated.


Thank you, Dr Cuadra, for allowing me to participate in your patient's care.











ANGEL BATRES DO Nov 8, 2018 20:47

## 2018-11-08 NOTE — HP
ADMIT DATE:  11/07/2018



PSYCHIATRIC ADMISSION HISTORY/EVALUATION



This late entry 11/07/2018 covers element, not covered in my initial note.  I

met with the patient at length in the evening of 11/07/2018.  Discussed with

nursing staff several times and with Pushpa Valdez  on 2 or 3

separate occasions.



IDENTIFYING DATA:  The patient is a 72-year-old  female referred to us

from [facility] Emergency Room where she presented with her

family on account of "manic behaviors and psychosis."  The patient had been

labile, tearful, anxious, paranoid, distractible.  She has failed outpatient

psychiatric interventions with Dr. Kian Nuñez her primary care physician.



We also reviewed extensive records approximately 80 pages or perhaps more

received from her outpatient providers with a diagnosis from a psychiatric

standpoint of anxiety, depression, ADHD, and some memory deficits, even though

she is reasonably oriented.



CHIEF COMPLAINT:  "I have panic attacks.  My mind keeps racing.  I cannot stay

still ____ less moved.  Can be sit down."  The patient has rapid speech,

extremely distractible, anxious, disorganized, but otherwise pleasant.  She is

quite paranoid.



HISTORY OF PRESENT ILLNESS:  The patient has a long history of treatment for

depression, panic attacks, anxiety, ADHD, but on close review of her history,

she appears to have history of mood swings with periods of elation, racing

thoughts alternating with getting depressed.  She has had sleep and appetite

changes.  The patient attributes much of her problems to having anesthesia for

surgery for her back and states the morphine made her extremely agitated.  No

active suicidal or homicidal ideation.  The patient states she is extremely

obsessive, perfectionistic, but then adds that the whole house is very

disorganized.



PAST PSYCHIATRIC HISTORY:  Review of the records indicates she is being treated

at different times for all of the above psychiatric disorders, but never

specifically for what appears to be bipolar disorder.



PAST MEDICAL HISTORY:  COPD, chronic venous insufficiency, hypertension,

hyponatremia, lumbar stenosis with neurogenic claudication, postmenopausal

hormone replacement, OCD, osteoarthritis to left knee, chronic pain,

hypercholesterolemia, spinal stenosis, spondylolisthesis, asthma, vitamin D

deficiency.



DRUG ALLERGIES:  Negative other than adverse reaction to MORPHINE.



FAMILY HISTORY:  Noncontributory.



SOCIAL HISTORY:  No history of alcohol, drug abuse, physical, sexual, or elder

abuse.  She is not known to be a perpetrator.



REACTION TO HOSPITALIZATION:  The patient reluctantly is accepting of this.



ASSETS:  Supportive family.



MENTAL STATUS EXAMINATION:  The patient was seen individually at length the

evening of 11/07/2018.  She is oriented to herself, situation, aware of the

date, year, month accurately.  She knew who the president was, able to do two

steps on serial sevens.  Somewhat distractible, hyperverbal, anxious.  No active

suicidal or homicidal ideation.



IMPRESSION:  Bipolar 1 disorder, manic versus mixed with psychotic features;

anxiety disorder, unspecified; impulse control disorder, unspecified; cognitive

disorder, unspecified.  Rest diagnoses as noted above.



PLAN:  Admit to Geropsychiatry Unit at [facility].  I will see the

patient daily individually from a psychiatric standpoint, medical followup with

Dr. Wen/Dr. Lopes.  CT head has been completed at my request.  No acute

changes, subtle areas of hypodensity within the cerebral white matter, chronic

small vessel disease, otherwise unremarkable.  The patient is currently on

Klonopin 0.25 mg daily p.r.n., melatonin 9 mg at bedtime, Seroquel 25 mg at

bedtime.  We will go ahead and add Zyprexa p.r.n. and started on Depakote 

mg a day.  Check CBC, CMP, valproic acid level in 3 days.  Change the Seroquel

to Risperdal 0.25 mg daily.  We will make further adjustments as clinically

indicated.  The patient has had a past neuropsychological testing by Dr. Carpio

which indicated some mild level of Alzheimer's dementia and major depressive

disorder.  Certainly on review of her entire history, past response to

treatment, and from the information I have received at this time it appears she

more than likely has a diagnosis of undiagnosed bipolar disorder as noted above.





______________________________

MAN RADHA MANN MD



DR:  KIKI/domitila  JOB#:  [id number] / [id number]

DD:  11/08/2018 06:57  DT:  11/08/2018 08:02

## 2018-11-09 VITALS — DIASTOLIC BLOOD PRESSURE: 78 MMHG | SYSTOLIC BLOOD PRESSURE: 130 MMHG

## 2018-11-09 VITALS — DIASTOLIC BLOOD PRESSURE: 92 MMHG | SYSTOLIC BLOOD PRESSURE: 125 MMHG

## 2018-11-09 RX ADMIN — LISINOPRIL SCH MG: 20 TABLET ORAL at 08:37

## 2018-11-09 RX ADMIN — ACETAMINOPHEN SCH MG: 325 TABLET, FILM COATED ORAL at 08:37

## 2018-11-09 RX ADMIN — DIVALPROEX SODIUM SCH MG: 500 TABLET, FILM COATED, EXTENDED RELEASE ORAL at 19:38

## 2018-11-09 RX ADMIN — MULTIPLE VITAMINS W/ MINERALS TAB SCH TAB: TAB at 08:37

## 2018-11-09 RX ADMIN — LIDOCAINE SCH PATCH: 50 PATCH CUTANEOUS at 08:41

## 2018-11-09 RX ADMIN — TRAZODONE HYDROCHLORIDE SCH MG: 50 TABLET, FILM COATED ORAL at 19:39

## 2018-11-09 RX ADMIN — RISPERIDONE SCH MG: 0.5 TABLET ORAL at 19:38

## 2018-11-09 RX ADMIN — Medication SCH EA: at 19:36

## 2018-11-09 RX ADMIN — DOCUSATE SODIUM SCH MG: 100 CAPSULE, LIQUID FILLED ORAL at 19:10

## 2018-11-09 RX ADMIN — ACETAMINOPHEN SCH MG: 325 TABLET, FILM COATED ORAL at 19:37

## 2018-11-09 RX ADMIN — RISPERIDONE SCH MG: 0.5 TABLET ORAL at 19:08

## 2018-11-09 RX ADMIN — MELATONIN TAB 3 MG SCH MG: 3 TAB at 19:37

## 2018-11-09 RX ADMIN — DIVALPROEX SODIUM SCH MG: 500 TABLET, FILM COATED, EXTENDED RELEASE ORAL at 19:08

## 2018-11-09 RX ADMIN — DOCUSATE SODIUM SCH MG: 100 CAPSULE, LIQUID FILLED ORAL at 19:38

## 2018-11-09 RX ADMIN — VITAMIN D, TAB 1000IU (100/BT) SCH UNIT: 25 TAB at 08:37

## 2018-11-09 NOTE — PDOC
Exam


Note:


Anjum Note:


Late entry for date of service November 8th, 2018. Please also refer to the 

separate dictated note~for this date of service dictated separately.~Patient 

seen individually. Discussed the patient with Nursing staff reviewed the chart.~

Reviewed interim history and current functioning. Reviewed vital signs,~Labs/ 

Radiology~and current medications noted below. Continue current treatment with 

the changes noted in the dictated addendum note





Assessment:


Vital Signs:





 VS - Last 72 Hours, by Label








  Date Time  Temp Pulse Resp B/P (MAP) Pulse Ox O2 Delivery O2 Flow Rate FiO2


 


11/9/18 16:03 97.5 90 22 125/92 (103) 99 Room Air  


 


11/9/18 08:37  81  130/78    


 


11/9/18 06:22 97.9 81 18 130/78 (95) 98 Room Air  


 


11/8/18 16:03 97.7 83 18 119/82 (94) 100 Room Air  


 


11/8/18 08:07  80  144/84    


 


11/8/18 05:59 97.8 80 22 144/84 (104) 100   


 


11/7/18 21:00 96.5       


 


11/7/18 10:04  61  123/65    


 


11/7/18 06:05 96.5 61 16 123/65 (84) 99   


 


11/7/18 01:48 97.5 76 18 146/87 (106) 96   








 Vital Signs








  Date Time  Temp Pulse Resp B/P (MAP) Pulse Ox O2 Delivery O2 Flow Rate FiO2


 


11/9/18 16:03 97.5 90 22 125/92 (103) 99 Room Air  








I&O











Intake and Output 


 


 11/9/18





 07:00


 


Intake Total 720 ml


 


Balance 720 ml


 


 


 


Intake Oral 720 ml


 


# Voids 1








Labs:





 Laboratory Tests








Test


 11/9/18


01:01


 


Group A Streptococcus Rapid


 Negative


(NEGATIVE)











Current Medications:


Meds:





Current Medications


Acetaminophen (Tylenol) 650 mg PRN Q6HRS  PRN PO PAIN / TEMP;  Start 11/7/18 at 

02:15


Multi-Ingredient Ointment (Analgesic Balm) 1 madalyn PRN QID  PRN TP MUSCLE PAIN 

Last administered on 11/8/18at 14:33;  Start 11/7/18 at 02:15


Al Hydroxide/Mg Hydroxide (Mylanta Plus Xs) 15 ml PRN AFTMEALHC  PRN PO 

DYSPEPSIA;  Start 11/7/18 at 02:15


Magnesium Hydroxide (Milk Of Magnesia) 2,400 mg PRN QHS  PRN PO CONSTIPATION;  

Start 11/7/18 at 02:15


Albuterol Sulfate (Ventolin Hfa Inhaler) 2 puff PRN Q4HRS  PRN INH SHORTNESS OF 

BREATH;  Start 11/7/18 at 09:45;  Stop 11/7/18 at 11:04;  Status DC


Clonazepam (KlonoPIN) 0.25 mg PRN DAILY  PRN PO ANXIETY Last administered on 11/ 9/18at 01:03;  Start 11/7/18 at 09:45


Vitamin D (Vitamin D3) 1,000 unit DAILY PO  Last administered on 11/9/18at 08:37

;  Start 11/8/18 at 09:00


Lisinopril (Prinivil) 20 mg DAILY PO  Last administered on 11/9/18at 08:37;  

Start 11/7/18 at 10:00


Melatonin 9 mg QHS PO  Last administered on 11/9/18at 19:37;  Start 11/7/18 at 

21:00


Multivitamins/ Calcium (Thera-M Plus) 1 tab DAILY PO  Last administered on 11/9/ 18at 08:37;  Start 11/8/18 at 09:00


Quetiapine Fumarate (SEROquel) 25 mg QHS PO  Last administered on 11/7/18at 20:

45;  Start 11/7/18 at 10:00;  Stop 11/8/18 at 07:23;  Status DC


Acetaminophen (Tylenol) 500 mg BID PO  Last administered on 11/9/18at 19:37;  

Start 11/7/18 at 21:00


Hydrochlorothiazide (Microzide) 12.5 mg DAILY PO  Last administered on 11/9/ 18at 08:37;  Start 11/7/18 at 10:00


Albuterol Sulfate (Ventolin) 2.5 mg PRN Q4HRS  PRN NEB SHORTNESS OF BREATH;  

Start 11/7/18 at 11:00


Olanzapine (ZyPREXA ZYDIS) 2.5 mg PRN Q2HR  PRN PO PSYCHOSIS Last administered 

on 11/9/18at 08:40;  Start 11/7/18 at 19:30


Divalproex Sodium (Depakote Er) 500 mg DAILY PO  Last administered on 11/8/18at 

08:13;  Start 11/8/18 at 08:00;  Stop 11/8/18 at 19:16;  Status DC


Risperidone (RisperDAL) 0.25 mg DAILY PO  Last administered on 11/8/18at 08:11;

  Start 11/8/18 at 09:00;  Stop 11/8/18 at 19:16;  Status DC


Divalproex Sodium (Depakote Er) 500 mg QHS PO  Last administered on 11/9/18at 19

:08;  Start 11/9/18 at 18:45


Risperidone (RisperDAL) 0.25 mg QHS PO ;  Start 11/9/18 at 21:00;  Stop 11/9/18 

at 21:00;  Status DC


Polyethylene Glycol (miraLAX) 17 gm PRN DAILY  PRN PO CONSTIPATION;  Start 11/8/ 18 at 21:15


Glycerin (Sani-Supp Adult) 1 supp PRN DAILY  PRN [place] constipation;  Start 11/8/ 18 at 21:15


Lidocaine (Lidoderm) 1 patch DAILY TD  Last administered on 11/9/18at 08:41;  

Start 11/9/18 at 09:00


Miscellaneous (Lidoderm Patch Removal) 1 ea QHS MC  Last administered on 11/9/ 18at 19:36;  Start 11/8/18 at 21:00


Risperidone (RisperDAL) 0.5 mg QHS PO  Last administered on 11/9/18at 19:08;  

Start 11/9/18 at 18:45


Trazodone HCl (Desyrel) 50 mg QHS PO  Last administered on 11/9/18at 19:39;  

Start 11/9/18 at 21:00


Trazodone HCl (Desyrel) 50 mg PRN QHS  PRN PO INSOMNIA;  Start 11/9/18 at 16:45


Docusate Sodium (Colace) 100 mg BID PO  Last administered on 11/9/18at 19:10;  

Start 11/9/18 at 18:45





Active Scripts


Active


Reported


Centrum Adults Tablet (Multivitamin/Iron/Folic Acid) 1 Each Tablet 1 Each PO 

DAILY


Vitamin D (Cholecalciferol (Vitamin D3)) 1,000 Unit Capsule 1,000 Unit PO DAILY


Proair Hfa Inhaler (Albuterol Sulfate) 8.5 Gm Hfa.aer.ad 2 Puff INH PRN Q4HRS 

PRN


Tylenol (Acetaminophen) 325 Mg Tablet 500 Mg PO BID


Clonazepam 0.5 Mg Tablet 0.25 Mg PO PRN DAILY PRN


Melatonin 3 Mg Tablet 10 Mg PO QHS


Seroquel (Quetiapine Fumarate) 25 Mg Tablet 25 Mg PO QHS


Lisinopril-Hctz 20-12.5 Mg Tab (Lisinopril/Hydrochlorothiazide) 1 Each Tablet 1 

Tab PO DAILY


I have reviewed the current psychotropics carefully including drug 

interactions.  Risk benefit ratio favors no change other than as noted in my 

dictated progress note.





Diagnosis:


Problems:  


(1) Anxiety disorder


(2) Bipolar affective, mixed, sev w/ psych


(3) Impulse control disorder











SOFÍA MANN MD Nov 9, 2018 19:52

## 2018-11-09 NOTE — PDOC
Exam


Note:


Anjum Note:


Please also refer to the separate dictated note~for this date of service 

dictated separately.~Patient seen individually. Discussed the patient with 

Nursing staff reviewed the chart.~Reviewed interim history and current 

functioning. Reviewed vital signs,~Labs/ Radiology~and current medications 

noted below. Continue current treatment with the changes noted in the dictated 

addendum note





Assessment:


Vital Signs:





 Vital Signs








  Date Time  Temp Pulse Resp B/P (MAP) Pulse Ox O2 Delivery O2 Flow Rate FiO2


 


11/9/18 16:03 97.5 90 22 125/92 (103) 99 Room Air  








I&O











Intake and Output 


 


 11/9/18





 07:00


 


Intake Total 720 ml


 


Balance 720 ml


 


 


 


Intake Oral 720 ml


 


# Voids 1








Labs:





 Laboratory Tests








Test


 11/9/18


01:01


 


Group A Streptococcus Rapid


 Negative


(NEGATIVE)











Current Medications:


Meds:





Current Medications


Acetaminophen (Tylenol) 650 mg PRN Q6HRS  PRN PO PAIN / TEMP;  Start 11/7/18 at 

02:15


Multi-Ingredient Ointment (Analgesic Balm) 1 madalyn PRN QID  PRN TP MUSCLE PAIN 

Last administered on 11/8/18at 14:33;  Start 11/7/18 at 02:15


Al Hydroxide/Mg Hydroxide (Mylanta Plus Xs) 15 ml PRN AFTMEALHC  PRN PO 

DYSPEPSIA;  Start 11/7/18 at 02:15


Magnesium Hydroxide (Milk Of Magnesia) 2,400 mg PRN QHS  PRN PO CONSTIPATION;  

Start 11/7/18 at 02:15


Albuterol Sulfate (Ventolin Hfa Inhaler) 2 puff PRN Q4HRS  PRN INH SHORTNESS OF 

BREATH;  Start 11/7/18 at 09:45;  Stop 11/7/18 at 11:04;  Status DC


Clonazepam (KlonoPIN) 0.25 mg PRN DAILY  PRN PO ANXIETY Last administered on 11/ 9/18at 01:03;  Start 11/7/18 at 09:45


Vitamin D (Vitamin D3) 1,000 unit DAILY PO  Last administered on 11/9/18at 08:37

;  Start 11/8/18 at 09:00


Lisinopril (Prinivil) 20 mg DAILY PO  Last administered on 11/9/18at 08:37;  

Start 11/7/18 at 10:00


Melatonin 9 mg QHS PO  Last administered on 11/9/18at 19:37;  Start 11/7/18 at 

21:00


Multivitamins/ Calcium (Thera-M Plus) 1 tab DAILY PO  Last administered on 11/9/ 18at 08:37;  Start 11/8/18 at 09:00


Quetiapine Fumarate (SEROquel) 25 mg QHS PO  Last administered on 11/7/18at 20:

45;  Start 11/7/18 at 10:00;  Stop 11/8/18 at 07:23;  Status DC


Acetaminophen (Tylenol) 500 mg BID PO  Last administered on 11/9/18at 19:37;  

Start 11/7/18 at 21:00


Hydrochlorothiazide (Microzide) 12.5 mg DAILY PO  Last administered on 11/9/ 18at 08:37;  Start 11/7/18 at 10:00


Albuterol Sulfate (Ventolin) 2.5 mg PRN Q4HRS  PRN NEB SHORTNESS OF BREATH;  

Start 11/7/18 at 11:00


Olanzapine (ZyPREXA ZYDIS) 2.5 mg PRN Q2HR  PRN PO PSYCHOSIS Last administered 

on 11/9/18at 08:40;  Start 11/7/18 at 19:30


Divalproex Sodium (Depakote Er) 500 mg DAILY PO  Last administered on 11/8/18at 

08:13;  Start 11/8/18 at 08:00;  Stop 11/8/18 at 19:16;  Status DC


Risperidone (RisperDAL) 0.25 mg DAILY PO  Last administered on 11/8/18at 08:11;

  Start 11/8/18 at 09:00;  Stop 11/8/18 at 19:16;  Status DC


Divalproex Sodium (Depakote Er) 500 mg QHS PO  Last administered on 11/9/18at 19

:08;  Start 11/9/18 at 18:45


Risperidone (RisperDAL) 0.25 mg QHS PO ;  Start 11/9/18 at 21:00;  Stop 11/9/18 

at 21:00;  Status DC


Polyethylene Glycol (miraLAX) 17 gm PRN DAILY  PRN PO CONSTIPATION;  Start 11/8/ 18 at 21:15


Glycerin (Sani-Supp Adult) 1 supp PRN DAILY  PRN [place] constipation;  Start 11/8/ 18 at 21:15


Lidocaine (Lidoderm) 1 patch DAILY TD  Last administered on 11/9/18at 08:41;  

Start 11/9/18 at 09:00


Miscellaneous (Lidoderm Patch Removal) 1 ea QHS MC  Last administered on 11/9/ 18at 19:36;  Start 11/8/18 at 21:00


Risperidone (RisperDAL) 0.5 mg QHS PO  Last administered on 11/9/18at 19:08;  

Start 11/9/18 at 18:45


Trazodone HCl (Desyrel) 50 mg QHS PO  Last administered on 11/9/18at 19:39;  

Start 11/9/18 at 21:00


Trazodone HCl (Desyrel) 50 mg PRN QHS  PRN PO INSOMNIA;  Start 11/9/18 at 16:45


Docusate Sodium (Colace) 100 mg BID PO  Last administered on 11/9/18at 19:10;  

Start 11/9/18 at 18:45





Active Scripts


Active


Reported


Centrum Adults Tablet (Multivitamin/Iron/Folic Acid) 1 Each Tablet 1 Each PO 

DAILY


Vitamin D (Cholecalciferol (Vitamin D3)) 1,000 Unit Capsule 1,000 Unit PO DAILY


Proair Hfa Inhaler (Albuterol Sulfate) 8.5 Gm Hfa.aer.ad 2 Puff INH PRN Q4HRS 

PRN


Tylenol (Acetaminophen) 325 Mg Tablet 500 Mg PO BID


Clonazepam 0.5 Mg Tablet 0.25 Mg PO PRN DAILY PRN


Melatonin 3 Mg Tablet 10 Mg PO QHS


Seroquel (Quetiapine Fumarate) 25 Mg Tablet 25 Mg PO QHS


Lisinopril-Hctz 20-12.5 Mg Tab (Lisinopril/Hydrochlorothiazide) 1 Each Tablet 1 

Tab PO DAILY


I have reviewed the current psychotropics carefully including drug 

interactions.  Risk benefit ratio favors no change other than as noted in my 

dictated progress note.





Diagnosis:


Problems:  


(1) Anxiety disorder


(2) Bipolar affective, mixed, sev w/ psych


(3) Impulse control disorder











SOFÍA MANN MD Nov 9, 2018 22:57

## 2018-11-10 VITALS — DIASTOLIC BLOOD PRESSURE: 91 MMHG | SYSTOLIC BLOOD PRESSURE: 146 MMHG

## 2018-11-10 VITALS — DIASTOLIC BLOOD PRESSURE: 71 MMHG | SYSTOLIC BLOOD PRESSURE: 121 MMHG

## 2018-11-10 RX ADMIN — DOCUSATE SODIUM SCH MG: 100 CAPSULE, LIQUID FILLED ORAL at 19:18

## 2018-11-10 RX ADMIN — DIVALPROEX SODIUM SCH MG: 500 TABLET, FILM COATED, EXTENDED RELEASE ORAL at 19:18

## 2018-11-10 RX ADMIN — ACETAMINOPHEN SCH MG: 325 TABLET, FILM COATED ORAL at 19:19

## 2018-11-10 RX ADMIN — Medication SCH EA: at 19:17

## 2018-11-10 RX ADMIN — MULTIPLE VITAMINS W/ MINERALS TAB SCH TAB: TAB at 09:49

## 2018-11-10 RX ADMIN — TRAZODONE HYDROCHLORIDE SCH MG: 50 TABLET, FILM COATED ORAL at 19:18

## 2018-11-10 RX ADMIN — DOCUSATE SODIUM SCH MG: 100 CAPSULE, LIQUID FILLED ORAL at 09:48

## 2018-11-10 RX ADMIN — RISPERIDONE SCH MG: 0.5 TABLET ORAL at 19:18

## 2018-11-10 RX ADMIN — LISINOPRIL SCH MG: 20 TABLET ORAL at 09:49

## 2018-11-10 RX ADMIN — ACETAMINOPHEN SCH MG: 325 TABLET, FILM COATED ORAL at 09:50

## 2018-11-10 RX ADMIN — BENZOCAINE AND MENTHOL PRN LOZ: 15; 3.6 LOZENGE ORAL at 21:45

## 2018-11-10 RX ADMIN — VITAMIN D, TAB 1000IU (100/BT) SCH UNIT: 25 TAB at 09:50

## 2018-11-10 RX ADMIN — MELATONIN TAB 3 MG SCH MG: 3 TAB at 19:18

## 2018-11-10 RX ADMIN — LIDOCAINE SCH PATCH: 50 PATCH CUTANEOUS at 09:51

## 2018-11-10 NOTE — PDOC
Exam


Note:


Anjum Note:


Please also refer to the separate dictated note~for this date of service 

dictated separately.~Patient seen individually. Discussed the patient with 

Nursing staff reviewed the chart.~Reviewed interim history and current 

functioning. Reviewed vital signs,~Labs/ Radiology~and current medications 

noted below. Continue current treatment with the changes noted in the dictated 

addendum note





Assessment:


Vital Signs:





 Vital Signs








  Date Time  Temp Pulse Resp B/P (MAP) Pulse Ox O2 Delivery O2 Flow Rate FiO2


 


11/10/18 16:03 97.7 88 20 146/91 (109) 100 Room Air  








I&O











Intake and Output 


 


 11/10/18





 07:00


 


Intake Total 840 ml


 


Balance 840 ml


 


 


 


Intake Oral 840 ml











Current Medications:


Meds:





Current Medications


Acetaminophen (Tylenol) 650 mg PRN Q6HRS  PRN PO PAIN / TEMP;  Start 11/7/18 at 

02:15


Multi-Ingredient Ointment (Analgesic Balm) 1 madalyn PRN QID  PRN TP MUSCLE PAIN 

Last administered on 11/8/18at 14:33;  Start 11/7/18 at 02:15


Al Hydroxide/Mg Hydroxide (Mylanta Plus Xs) 15 ml PRN AFTMEALHC  PRN PO 

DYSPEPSIA;  Start 11/7/18 at 02:15


Magnesium Hydroxide (Milk Of Magnesia) 2,400 mg PRN QHS  PRN PO CONSTIPATION;  

Start 11/7/18 at 02:15


Albuterol Sulfate (Ventolin Hfa Inhaler) 2 puff PRN Q4HRS  PRN INH SHORTNESS OF 

BREATH;  Start 11/7/18 at 09:45;  Stop 11/7/18 at 11:04;  Status DC


Clonazepam (KlonoPIN) 0.25 mg PRN DAILY  PRN PO ANXIETY Last administered on 11/

10/18at 14:05;  Start 11/7/18 at 09:45


Vitamin D (Vitamin D3) 1,000 unit DAILY PO  Last administered on 11/10/18at 09:

50;  Start 11/8/18 at 09:00


Lisinopril (Prinivil) 20 mg DAILY PO  Last administered on 11/10/18at 09:49;  

Start 11/7/18 at 10:00


Melatonin 9 mg QHS PO  Last administered on 11/10/18at 19:18;  Start 11/7/18 at 

21:00


Multivitamins/ Calcium (Thera-M Plus) 1 tab DAILY PO  Last administered on 11/10

/18at 09:49;  Start 11/8/18 at 09:00


Quetiapine Fumarate (SEROquel) 25 mg QHS PO  Last administered on 11/7/18at 20:

45;  Start 11/7/18 at 10:00;  Stop 11/8/18 at 07:23;  Status DC


Acetaminophen (Tylenol) 500 mg BID PO  Last administered on 11/10/18at 19:19;  

Start 11/7/18 at 21:00


Hydrochlorothiazide (Microzide) 12.5 mg DAILY PO  Last administered on 11/10/

18at 09:48;  Start 11/7/18 at 10:00


Albuterol Sulfate (Ventolin) 2.5 mg PRN Q4HRS  PRN NEB SHORTNESS OF BREATH;  

Start 11/7/18 at 11:00


Olanzapine (ZyPREXA ZYDIS) 2.5 mg PRN Q2HR  PRN PO PSYCHOSIS Last administered 

on 11/10/18at 15:42;  Start 11/7/18 at 19:30


Divalproex Sodium (Depakote Er) 500 mg DAILY PO  Last administered on 11/8/18at 

08:13;  Start 11/8/18 at 08:00;  Stop 11/8/18 at 19:16;  Status DC


Risperidone (RisperDAL) 0.25 mg DAILY PO  Last administered on 11/8/18at 08:11;

  Start 11/8/18 at 09:00;  Stop 11/8/18 at 19:16;  Status DC


Divalproex Sodium (Depakote Er) 500 mg QHS PO  Last administered on 11/10/18at 

19:18;  Start 11/9/18 at 18:45


Risperidone (RisperDAL) 0.25 mg QHS PO ;  Start 11/9/18 at 21:00;  Stop 11/9/18 

at 21:00;  Status DC


Polyethylene Glycol (miraLAX) 17 gm PRN DAILY  PRN PO CONSTIPATION;  Start 11/8/ 18 at 21:15


Glycerin (Sani-Supp Adult) 1 supp PRN DAILY  PRN [place] constipation;  Start 11/8/ 18 at 21:15


Lidocaine (Lidoderm) 1 patch DAILY TD  Last administered on 11/10/18at 09:51;  

Start 11/9/18 at 09:00


Miscellaneous (Lidoderm Patch Removal) 1 ea QHS MC  Last administered on 11/10/

18at 19:17;  Start 11/8/18 at 21:00


Risperidone (RisperDAL) 0.5 mg QHS PO  Last administered on 11/10/18at 19:18;  

Start 11/9/18 at 18:45


Trazodone HCl (Desyrel) 50 mg QHS PO  Last administered on 11/10/18at 19:18;  

Start 11/9/18 at 21:00


Trazodone HCl (Desyrel) 50 mg PRN QHS  PRN PO INSOMNIA Last administered on 11/

10/18at 21:45;  Start 11/9/18 at 16:45


Docusate Sodium (Colace) 100 mg BID PO  Last administered on 11/10/18at 19:18;  

Start 11/9/18 at 18:45


Throat Lozenges (Cepacol Sore Throat Lozenge) 1 missy PRN Q2HR  PRN PO SORE 

THROAT Last administered on 11/10/18at 21:45;  Start 11/10/18 at 17:45





Active Scripts


Active


Reported


Centrum Adults Tablet (Multivitamin/Iron/Folic Acid) 1 Each Tablet 1 Each PO 

DAILY


Vitamin D (Cholecalciferol (Vitamin D3)) 1,000 Unit Capsule 1,000 Unit PO DAILY


Proair Hfa Inhaler (Albuterol Sulfate) 8.5 Gm Hfa.aer.ad 2 Puff INH PRN Q4HRS 

PRN


Tylenol (Acetaminophen) 325 Mg Tablet 500 Mg PO BID


Clonazepam 0.5 Mg Tablet 0.25 Mg PO PRN DAILY PRN


Melatonin 3 Mg Tablet 10 Mg PO QHS


Seroquel (Quetiapine Fumarate) 25 Mg Tablet 25 Mg PO QHS


Lisinopril-Hctz 20-12.5 Mg Tab (Lisinopril/Hydrochlorothiazide) 1 Each Tablet 1 

Tab PO DAILY


I have reviewed the current psychotropics carefully including drug 

interactions.  Risk benefit ratio favors no change other than as noted in my 

dictated progress note.





Diagnosis:


Problems:  


(1) Anxiety disorder


(2) Bipolar affective, mixed, sev w/ psych


(3) Impulse control disorder











SOFÍA MANN MD Nov 10, 2018 22:57

## 2018-11-10 NOTE — PN
DATE:  11/08/2018



This late entry, 11/08/2018, covers elements not covered in my initial note.



SUBJECTIVE:  I met with the patient in the evening and the patient was staffed

at treatment team meeting with the entire team in the morning.  We tried to

connect the patient's  for the treatment team meeting as well, but he was

unavailable.  Reviewed her history at length and I have reviewed about 100 pages

of records from her primary care physician neuropsychological testing from Dr. Carpio, all of which reflects depressive symptoms, anxiety symptoms and disorders.

 On a careful review of the entire history and her current presentation of

manic, hyperverbal state and periods of mood swings with elation, racing

thoughts alternating with depression, all of which is reflective of a diagnosis

of bipolar 1 disorder.  CT head does show white matter disease, which is

chronic.  No acute changes.



REVIEW OF SYSTEMS:  No CV, , pulmonary, eye, ENT system symptoms on review. 

Reliability varies.



MENTAL STATUS EXAM:  The patient is reasonably oriented.  Speech coherent,

rapid.  She is writing things vociferously on paper wanting me to answer minute

details about her medications, which I have done repeatedly at great length with

her, but she keeps coming back to it, somewhat obsessive.  Abstraction fair. 

Computation, she is distractable, able to do 2-step serial 7's.  No active

suicidal or homicidal ideation.  Mood and affect remains labile.



LABORATORY DATA:  Reviewed.



IMPRESSION:  Bipolar 1 disorder, mixed with psychotic features; anxiety

disorder, unspecified; mild cognitive impairment.



PLAN:  We had initiated Depakote  mg in the morning, Risperdal 0.25 mg in

the morning, but we will go ahead and change this to Depakote  mg p.o. at

bedtime and Risperdal 0.25 mg p.o. at bedtime, increasing to 0.5 mg p.o. at

bedtime on account of her ongoing psychotic symptoms.  Have had a prior

authorization call for inpatient hospitalizations with Dr. Cabrera, [employer]

reviewer and they indicate the patient does not meet inpatient criteria set out

by them, but rather for intensive outpatient.  I have discussed this with Pushpa Valdez,  and she has discussed this with the family.  The

family wanting to continue inpatient stabilization and will work things out with

the insurance themselves and then setting up outpatient with Psychiatry followup

post-discharge.  I have had a lengthy discussion with nursing staff as well

during all of this.  We will be checking labs level on the Depakote in 3 days.





______________________________

SOFÍA MANN MD



DR:  KIKI/domitila  JOB#:  [id number] / [id number]

DD:  11/10/2018 11:53  DT:  11/10/2018 12:34

## 2018-11-11 VITALS — SYSTOLIC BLOOD PRESSURE: 137 MMHG | DIASTOLIC BLOOD PRESSURE: 83 MMHG

## 2018-11-11 VITALS — DIASTOLIC BLOOD PRESSURE: 72 MMHG | SYSTOLIC BLOOD PRESSURE: 110 MMHG

## 2018-11-11 LAB
ALBUMIN SERPL-MCNC: 3.2 G/DL (ref 3.4–5)
ALBUMIN/GLOB SERPL: 0.9 {RATIO} (ref 1–1.7)
ALP SERPL-CCNC: 81 U/L (ref 46–116)
ALT SERPL-CCNC: 38 U/L (ref 14–59)
ANION GAP SERPL CALC-SCNC: 11 MMOL/L (ref 6–14)
ANION GAP SERPL CALC-SCNC: 7 MMOL/L (ref 6–14)
AST SERPL-CCNC: 62 U/L (ref 15–37)
BASOPHILS # BLD AUTO: 0 X10^3/UL (ref 0–0.2)
BASOPHILS NFR BLD: 0 % (ref 0–3)
BILIRUB SERPL-MCNC: 0.4 MG/DL (ref 0.2–1)
BUN/CREAT SERPL: 17 (ref 6–20)
CA-I SERPL ISE-MCNC: 10 MG/DL (ref 7–20)
CA-I SERPL ISE-MCNC: 12 MG/DL (ref 7–20)
CALCIUM SERPL-MCNC: 9 MG/DL (ref 8.5–10.1)
CALCIUM SERPL-MCNC: 9.2 MG/DL (ref 8.5–10.1)
CHLORIDE SERPL-SCNC: 95 MMOL/L (ref 98–107)
CHLORIDE SERPL-SCNC: 96 MMOL/L (ref 98–107)
CO2 SERPL-SCNC: 22 MMOL/L (ref 21–32)
CO2 SERPL-SCNC: 28 MMOL/L (ref 21–32)
CREAT SERPL-MCNC: 0.6 MG/DL (ref 0.6–1)
CREAT SERPL-MCNC: 0.6 MG/DL (ref 0.6–1)
EOSINOPHIL NFR BLD: 0.1 X10^3/UL (ref 0–0.7)
EOSINOPHIL NFR BLD: 2 % (ref 0–3)
ERYTHROCYTE [DISTWIDTH] IN BLOOD BY AUTOMATED COUNT: 12.2 % (ref 11.5–14.5)
GFR SERPLBLD BASED ON 1.73 SQ M-ARVRAT: 98.8 ML/MIN
GFR SERPLBLD BASED ON 1.73 SQ M-ARVRAT: 98.8 ML/MIN
GLOBULIN SER-MCNC: 3.6 G/DL (ref 2.2–3.8)
GLUCOSE SERPL-MCNC: 94 MG/DL (ref 70–99)
GLUCOSE SERPL-MCNC: 98 MG/DL (ref 70–99)
HCT VFR BLD CALC: 42.6 % (ref 36–47)
HGB BLD-MCNC: 14.2 G/DL (ref 12–15.5)
LYMPHOCYTES # BLD: 0.9 X10^3/UL (ref 1–4.8)
LYMPHOCYTES NFR BLD AUTO: 15 % (ref 24–48)
MCH RBC QN AUTO: 32 PG (ref 25–35)
MCHC RBC AUTO-ENTMCNC: 33 G/DL (ref 31–37)
MCV RBC AUTO: 96 FL (ref 79–100)
MONO #: 0.5 X10^3/UL (ref 0–1.1)
MONOCYTES NFR BLD: 9 % (ref 0–9)
NEUT #: 4.4 X10^3UL (ref 1.8–7.7)
NEUTROPHILS NFR BLD AUTO: 74 % (ref 31–73)
PLATELET # BLD AUTO: 233 X10^3/UL (ref 140–400)
POTASSIUM SERPL-SCNC: 3.9 MMOL/L (ref 3.5–5.1)
POTASSIUM SERPL-SCNC: 4 MMOL/L (ref 3.5–5.1)
PROT SERPL-MCNC: 6.8 G/DL (ref 6.4–8.2)
RBC # BLD AUTO: 4.43 X10^6/UL (ref 3.5–5.4)
SODIUM SERPL-SCNC: 129 MMOL/L (ref 136–145)
SODIUM SERPL-SCNC: 130 MMOL/L (ref 136–145)
VAL ACID: 45 MCG/ML (ref 50–100)
WBC # BLD AUTO: 5.9 X10^3/UL (ref 4–11)

## 2018-11-11 RX ADMIN — ACETAMINOPHEN PRN MG: 325 TABLET, FILM COATED ORAL at 16:16

## 2018-11-11 RX ADMIN — DOCUSATE SODIUM SCH MG: 100 CAPSULE, LIQUID FILLED ORAL at 20:50

## 2018-11-11 RX ADMIN — Medication SCH EA: at 21:00

## 2018-11-11 RX ADMIN — ACETAMINOPHEN SCH MG: 325 TABLET, FILM COATED ORAL at 09:42

## 2018-11-11 RX ADMIN — LISINOPRIL SCH MG: 20 TABLET ORAL at 09:42

## 2018-11-11 RX ADMIN — MELATONIN TAB 3 MG SCH MG: 3 TAB at 20:51

## 2018-11-11 RX ADMIN — DIVALPROEX SODIUM SCH MG: 250 TABLET, FILM COATED, EXTENDED RELEASE ORAL at 20:52

## 2018-11-11 RX ADMIN — ACETAMINOPHEN PRN MG: 325 TABLET, FILM COATED ORAL at 04:54

## 2018-11-11 RX ADMIN — ACETAMINOPHEN SCH MG: 325 TABLET, FILM COATED ORAL at 20:50

## 2018-11-11 RX ADMIN — BENZOCAINE AND MENTHOL PRN LOZ: 15; 3.6 LOZENGE ORAL at 22:08

## 2018-11-11 RX ADMIN — Medication SCH EA: at 20:52

## 2018-11-11 RX ADMIN — VITAMIN D, TAB 1000IU (100/BT) SCH UNIT: 25 TAB at 09:43

## 2018-11-11 RX ADMIN — LIDOCAINE SCH PATCH: 50 PATCH CUTANEOUS at 09:43

## 2018-11-11 RX ADMIN — TRAZODONE HYDROCHLORIDE SCH MG: 50 TABLET, FILM COATED ORAL at 20:51

## 2018-11-11 RX ADMIN — BENZOCAINE AND MENTHOL PRN LOZ: 15; 3.6 LOZENGE ORAL at 04:54

## 2018-11-11 RX ADMIN — DOCUSATE SODIUM SCH MG: 100 CAPSULE, LIQUID FILLED ORAL at 09:41

## 2018-11-11 RX ADMIN — MULTIPLE VITAMINS W/ MINERALS TAB SCH TAB: TAB at 09:42

## 2018-11-11 NOTE — PN
DATE:  11/10/2018



PSYCHIATRIC PROGRESS NOTE



This is a late entry 11/10/2018 covers elements not covered in my initial note.



MENTAL STATUS EXAMINATION:  I met with the patient in the evening.  The patient

slept 9 hours previous night.





______________________________

MAN RADHA MANN MD



DR:  KIKI/domitila  JOB#:  [id number] / [id number]

DD:  11/11/2018 14:21  DT:  11/11/2018 17:16

## 2018-11-11 NOTE — PN
DATE:  11/09/2018



PSYCHIATRIC PROGRESS NOTE



This is a late entry 11/09/2018 covers elements not covered in my initial note.



SUBJECTIVE:  I met with the patient in the evening.  The patient slept 4-1/2

hours previous night.  She has appeared somewhat manic, hyperverbal per nursing

report.  The patient was extremely obsessive, making multiple notes wanting me

to explain her medications repeatedly which I did, extremely rapid thinking

persists.  Paranoia is present, but showing some improvement.  Slept 4-1/2 hours

previous evening.



REVIEW OF SYSTEMS:  No CV, , pulmonary, eye, ENT system symptoms on review.



MENTAL STATUS EXAM:  Reasonably oriented.  Speech coherent, rapid.  Abstraction

fair, computation impaired, language function intact, attention span short. 

Mood and affect remain somewhat labile, manic.



LABORATORY DATA:  Reviewed.



IMPRESSION:  Bipolar 1 disorder, mixed with psychotic features; anxiety

disorder, unspecified; obsessive compulsive disorder symptoms.



PLAN:  Continue Depakote  mg at bedtime.  Risperdal changed to 0.25 mg at

bedtime, may need to increase this and in fact we will go ahead and increase it

to 0.5 mg at bedtime.  Start trazodone 50 mg at bedtime, may repeat x 1 for

insomnia arrest.  We will adjust the Depakote post the next set of labs and

valproic acid.





______________________________

MAN RADHA MANN MD



DR:  KIKI/domitila  JOB#:  [id number] / [id number]

DD:  11/11/2018 11:52  DT:  11/11/2018 12:22

## 2018-11-11 NOTE — PDOC
Exam


Note:


Anjum Note:


Please also refer to the separate dictated note~for this date of service 

dictated separately.~Patient seen individually. Discussed the patient with 

Nursing staff reviewed the chart.~Reviewed interim history and current 

functioning. Reviewed vital signs,~Labs/ Radiology~and current medications 

noted below. Continue current treatment with the changes noted in the dictated 

addendum note





Assessment:


Vital Signs:





 Vital Signs








  Date Time  Temp Pulse Resp B/P (MAP) Pulse Ox O2 Delivery O2 Flow Rate FiO2


 


11/11/18 16:10 97.2  20     


 


11/11/18 14:49  83  110/72 (85) 99   


 


11/11/18 07:10      Room Air  








I&O











Intake and Output 


 


 11/11/18





 07:00


 


Intake Total 1080 ml


 


Balance 1080 ml


 


 


 


Intake Oral 1080 ml








Labs:





 Laboratory Tests








Test


 11/11/18


10:05 11/11/18


20:10


 


White Blood Count


 5.9 x10^3/uL


(4.0-11.0) 





 


Red Blood Count


 4.43 x10^6/uL


(3.50-5.40) 





 


Hemoglobin


 14.2 g/dL


(12.0-15.5) 





 


Hematocrit


 42.6 %


(36.0-47.0) 





 


Mean Corpuscular Volume


 96 fL ()


 





 


Mean Corpuscular Hemoglobin 32 pg (25-35)   


 


Mean Corpuscular Hemoglobin


Concent 33 g/dL


(31-37) 





 


Red Cell Distribution Width


 12.2 %


(11.5-14.5) 





 


Platelet Count


 233 x10^3/uL


(140-400) 





 


Neutrophils (%) (Auto) 74 % (31-73)  H 


 


Lymphocytes (%) (Auto) 15 % (24-48)  L 


 


Monocytes (%) (Auto) 9 % (0-9)   


 


Eosinophils (%) (Auto) 2 % (0-3)   


 


Basophils (%) (Auto) 0 % (0-3)   


 


Neutrophils # (Auto)


 4.4 x10^3uL


(1.8-7.7) 





 


Lymphocytes # (Auto)


 0.9 x10^3/uL


(1.0-4.8)  L 





 


Monocytes # (Auto)


 0.5 x10^3/uL


(0.0-1.1) 





 


Eosinophils # (Auto)


 0.1 x10^3/uL


(0.0-0.7) 





 


Basophils # (Auto)


 0.0 x10^3/uL


(0.0-0.2) 





 


Sodium Level


 129 mmol/L


(136-145)  L 130 mmol/L


(136-145)  L


 


Potassium Level


 4.0 mmol/L


(3.5-5.1) 3.9 mmol/L


(3.5-5.1)


 


Chloride Level


 96 mmol/L


()  L 95 mmol/L


()  L


 


Carbon Dioxide Level


 22 mmol/L


(21-32) 28 mmol/L


(21-32)


 


Anion Gap 11 (6-14)   7 (6-14)  


 


Blood Urea Nitrogen


 10 mg/dL


(7-20) 12 mg/dL


(7-20)


 


Creatinine


 0.6 mg/dL


(0.6-1.0) 0.6 mg/dL


(0.6-1.0)


 


Estimated GFR


(Cockcroft-Gault) 98.8  


 98.8  





 


BUN/Creatinine Ratio 17 (6-20)   


 


Glucose Level


 94 mg/dL


(70-99) 98 mg/dL


(70-99)


 


Calcium Level


 9.0 mg/dL


(8.5-10.1) 9.2 mg/dL


(8.5-10.1)


 


Total Bilirubin


 0.4 mg/dL


(0.2-1.0) 





 


Aspartate Amino Transferase


(AST) 62 U/L (15-37)


H 





 


Alanine Aminotransferase (ALT)


 38 U/L (14-59)


 





 


Alkaline Phosphatase


 81 U/L


() 





 


Total Protein


 6.8 g/dL


(6.4-8.2) 





 


Albumin


 3.2 g/dL


(3.4-5.0)  L 





 


Albumin/Globulin Ratio


 0.9 (1.0-1.7)


L 





 


Valproic Acid Level


 45 mcg/mL


()  L 





 


Valproic Acid Last Dose Date 11/10/2018   


 


Valproic Acid Last Dose Time 2100   











Current Medications:


Meds:





Current Medications


Acetaminophen (Tylenol) 650 mg PRN Q6HRS  PRN PO PAIN / TEMP Last administered 

on 11/11/18at 16:16;  Start 11/7/18 at 02:15


Multi-Ingredient Ointment (Analgesic Balm) 1 madalyn PRN QID  PRN TP MUSCLE PAIN 

Last administered on 11/8/18at 14:33;  Start 11/7/18 at 02:15


Al Hydroxide/Mg Hydroxide (Mylanta Plus Xs) 15 ml PRN AFTMEALHC  PRN PO 

DYSPEPSIA;  Start 11/7/18 at 02:15


Magnesium Hydroxide (Milk Of Magnesia) 2,400 mg PRN QHS  PRN PO CONSTIPATION;  

Start 11/7/18 at 02:15


Albuterol Sulfate (Ventolin Hfa Inhaler) 2 puff PRN Q4HRS  PRN INH SHORTNESS OF 

BREATH;  Start 11/7/18 at 09:45;  Stop 11/7/18 at 11:04;  Status DC


Clonazepam (KlonoPIN) 0.25 mg PRN DAILY  PRN PO ANXIETY Last administered on 11/

10/18at 14:05;  Start 11/7/18 at 09:45


Vitamin D (Vitamin D3) 1,000 unit DAILY PO  Last administered on 11/11/18at 09:

43;  Start 11/8/18 at 09:00


Lisinopril (Prinivil) 20 mg DAILY PO  Last administered on 11/11/18at 09:42;  

Start 11/7/18 at 10:00


Melatonin 9 mg QHS PO  Last administered on 11/11/18at 20:51;  Start 11/7/18 at 

21:00


Multivitamins/ Calcium (Thera-M Plus) 1 tab DAILY PO  Last administered on 11/11 /18at 09:42;  Start 11/8/18 at 09:00


Quetiapine Fumarate (SEROquel) 25 mg QHS PO  Last administered on 11/7/18at 20:

45;  Start 11/7/18 at 10:00;  Stop 11/8/18 at 07:23;  Status DC


Acetaminophen (Tylenol) 500 mg BID PO  Last administered on 11/11/18at 20:50;  

Start 11/7/18 at 21:00


Hydrochlorothiazide (Microzide) 12.5 mg DAILY PO  Last administered on 11/11/ 18at 09:42;  Start 11/7/18 at 10:00


Albuterol Sulfate (Ventolin) 2.5 mg PRN Q4HRS  PRN NEB SHORTNESS OF BREATH;  

Start 11/7/18 at 11:00


Olanzapine (ZyPREXA ZYDIS) 2.5 mg PRN Q2HR  PRN PO PSYCHOSIS Last administered 

on 11/11/18at 04:55;  Start 11/7/18 at 19:30


Divalproex Sodium (Depakote Er) 500 mg DAILY PO  Last administered on 11/8/18at 

08:13;  Start 11/8/18 at 08:00;  Stop 11/8/18 at 19:16;  Status DC


Risperidone (RisperDAL) 0.25 mg DAILY PO  Last administered on 11/8/18at 08:11;

  Start 11/8/18 at 09:00;  Stop 11/8/18 at 19:16;  Status DC


Divalproex Sodium (Depakote Er) 500 mg QHS PO  Last administered on 11/10/18at 

19:18;  Start 11/9/18 at 18:45;  Stop 11/11/18 at 18:19;  Status DC


Risperidone (RisperDAL) 0.25 mg QHS PO ;  Start 11/9/18 at 21:00;  Stop 11/9/18 

at 21:00;  Status DC


Polyethylene Glycol (miraLAX) 17 gm PRN DAILY  PRN PO CONSTIPATION;  Start 11/8/ 18 at 21:15


Glycerin (Sani-Supp Adult) 1 supp PRN DAILY  PRN DE constipation;  Start 11/8/ 18 at 21:15


Lidocaine (Lidoderm) 1 patch DAILY TD  Last administered on 11/11/18at 09:43;  

Start 11/9/18 at 09:00


Miscellaneous (Lidoderm Patch Removal) 1 ea QHS MC  Last administered on 11/11/ 18at 20:52;  Start 11/8/18 at 21:00


Risperidone (RisperDAL) 0.5 mg QHS PO  Last administered on 11/10/18at 19:18;  

Start 11/9/18 at 18:45;  Stop 11/11/18 at 20:14;  Status DC


Trazodone HCl (Desyrel) 50 mg QHS PO  Last administered on 11/11/18at 20:51;  

Start 11/9/18 at 21:00


Trazodone HCl (Desyrel) 50 mg PRN QHS  PRN PO INSOMNIA Last administered on 11/

10/18at 21:45;  Start 11/9/18 at 16:45


Docusate Sodium (Colace) 100 mg BID PO  Last administered on 11/11/18at 20:50;  

Start 11/9/18 at 18:45


Throat Lozenges (Cepacol Sore Throat Lozenge) 1 missy PRN Q2HR  PRN PO SORE 

THROAT Last administered on 11/11/18at 22:08;  Start 11/10/18 at 17:45


Sodium Chloride 1,000 ml @  500 mls/hr 1X  ONCE IV  Last administered on 11/11/ 18at 17:33;  Start 11/11/18 at 16:30;  Stop 11/11/18 at 18:29;  Status DC


Divalproex Sodium (Depakote Er) 750 mg QHS PO  Last administered on 11/11/18at 

20:52;  Start 11/11/18 at 21:00


Risperidone (RisperDAL) 0.25 mg QHS PO  Last administered on 11/11/18at 20:52;  

Start 11/11/18 at 21:00


Lidocaine (Lidoderm) 1 patch DAILY TD ;  Start 11/12/18 at 09:00


Miscellaneous (Lidoderm Patch Removal) 1 ea QHS MC  Last administered on 11/11/ 18at 21:00;  Start 11/11/18 at 21:00





Active Scripts


Active


Reported


Centrum Adults Tablet (Multivitamin/Iron/Folic Acid) 1 Each Tablet 1 Each PO 

DAILY


Vitamin D (Cholecalciferol (Vitamin D3)) 1,000 Unit Capsule 1,000 Unit PO DAILY


Proair Hfa Inhaler (Albuterol Sulfate) 8.5 Gm Hfa.aer.ad 2 Puff INH PRN Q4HRS 

PRN


Tylenol (Acetaminophen) 325 Mg Tablet 500 Mg PO BID


Clonazepam 0.5 Mg Tablet 0.25 Mg PO PRN DAILY PRN


Melatonin 3 Mg Tablet 10 Mg PO QHS


Seroquel (Quetiapine Fumarate) 25 Mg Tablet 25 Mg PO QHS


Lisinopril-Hctz 20-12.5 Mg Tab (Lisinopril/Hydrochlorothiazide) 1 Each Tablet 1 

Tab PO DAILY


I have reviewed the current psychotropics carefully including drug 

interactions.  Risk benefit ratio favors no change other than as noted in my 

dictated progress note.





Diagnosis:


Problems:  


(1) Anxiety disorder


(2) Bipolar affective, mixed, sev w/ psych


(3) Impulse control disorder











SOFÍA MANN MD Nov 11, 2018 22:57

## 2018-11-11 NOTE — PN
DATE:  11/10/2018



PSYCHIATRIC PROGRESS NOTE



This is a late entry 11/10/2018 covers elements not covered in my initial note.



SUBJECTIVE:  I met with the patient in the evening.  The patient slept 9 hours

previous night.  I met with her in her room together with her  and son. 

We had a lengthy discussion about her diagnosis, her past history, my review of

all her past medical records from a primary care physician and

neuropsychological testing with Dr. Carpio and the diagnosis of bipolar disorder,

manic with psychotic features.  She remains somewhat obsessive, ruminative,

labile in her mood, more so in the family visits.  Complains of sore throat.  



REVIEW OF SYSTEMS:   No CV, , GI system symptoms on review.



MENTAL STATUS EXAM:  Reasonably oriented.  Speech coherent, rapid.  Mood is

labile, but sometimes able to control herself very well.  Abstraction fair,

computation impaired, language function intact.  Attention span short.



LABORATORY DATA:  Labs will be checked morning of 11/11/2018 with valproic acid

level.



IMPRESSION:  Bipolar 1 disorder, manic with psychotic features; anxiety

disorder, unspecified.



PLAN:  Continue Depakote  at bedtime, Risperdal 0.5 mg at bedtime.  Rest

unchanged from initial note.





______________________________

MAN RADHA MANN MD DR:  KIKI/domitila  JOB#:  [id number] / [id number]

DD:  11/11/2018 11:54  DT:  11/11/2018 12:29

## 2018-11-12 VITALS — DIASTOLIC BLOOD PRESSURE: 84 MMHG | SYSTOLIC BLOOD PRESSURE: 134 MMHG

## 2018-11-12 VITALS — SYSTOLIC BLOOD PRESSURE: 116 MMHG | DIASTOLIC BLOOD PRESSURE: 73 MMHG

## 2018-11-12 LAB
ANION GAP SERPL CALC-SCNC: 10 MMOL/L (ref 6–14)
CA-I SERPL ISE-MCNC: 6 MG/DL (ref 7–20)
CALCIUM SERPL-MCNC: 8.5 MG/DL (ref 8.5–10.1)
CHLORIDE SERPL-SCNC: 101 MMOL/L (ref 98–107)
CO2 SERPL-SCNC: 24 MMOL/L (ref 21–32)
CREAT SERPL-MCNC: 0.6 MG/DL (ref 0.6–1)
GFR SERPLBLD BASED ON 1.73 SQ M-ARVRAT: 98.8 ML/MIN
GLUCOSE SERPL-MCNC: 111 MG/DL (ref 70–99)
POTASSIUM SERPL-SCNC: 4 MMOL/L (ref 3.5–5.1)
SODIUM SERPL-SCNC: 135 MMOL/L (ref 136–145)

## 2018-11-12 RX ADMIN — Medication SCH EA: at 20:01

## 2018-11-12 RX ADMIN — MULTIPLE VITAMINS W/ MINERALS TAB SCH TAB: TAB at 09:31

## 2018-11-12 RX ADMIN — ACETAMINOPHEN PRN MG: 325 TABLET, FILM COATED ORAL at 16:37

## 2018-11-12 RX ADMIN — LIDOCAINE SCH PATCH: 50 PATCH CUTANEOUS at 09:32

## 2018-11-12 RX ADMIN — RISPERIDONE SCH MG: 0.25 TABLET ORAL at 19:37

## 2018-11-12 RX ADMIN — BENZOCAINE AND MENTHOL PRN LOZ: 15; 3.6 LOZENGE ORAL at 06:21

## 2018-11-12 RX ADMIN — DIVALPROEX SODIUM SCH MG: 250 TABLET, FILM COATED, EXTENDED RELEASE ORAL at 19:33

## 2018-11-12 RX ADMIN — TRAZODONE HYDROCHLORIDE SCH MG: 50 TABLET, FILM COATED ORAL at 19:34

## 2018-11-12 RX ADMIN — LIDOCAINE SCH PATCH: 50 PATCH CUTANEOUS at 09:33

## 2018-11-12 RX ADMIN — DOCUSATE SODIUM SCH MG: 100 CAPSULE, LIQUID FILLED ORAL at 09:28

## 2018-11-12 RX ADMIN — DOCUSATE SODIUM SCH MG: 100 CAPSULE, LIQUID FILLED ORAL at 19:34

## 2018-11-12 RX ADMIN — VITAMIN D, TAB 1000IU (100/BT) SCH UNIT: 25 TAB at 09:32

## 2018-11-12 RX ADMIN — MELATONIN TAB 3 MG SCH MG: 3 TAB at 19:33

## 2018-11-12 RX ADMIN — ACETAMINOPHEN SCH MG: 325 TABLET, FILM COATED ORAL at 19:34

## 2018-11-12 RX ADMIN — LISINOPRIL SCH MG: 20 TABLET ORAL at 09:00

## 2018-11-12 RX ADMIN — ACETAMINOPHEN SCH MG: 325 TABLET, FILM COATED ORAL at 09:32

## 2018-11-12 RX ADMIN — MIRTAZAPINE SCH MG: 7.5 TABLET, FILM COATED ORAL at 19:37

## 2018-11-12 NOTE — PN
DATE:  11/11/2018



PSYCHIATRIC PROGRESS NOTE



This late entry 11/11/2018 covers elements not covered in my initial note.



SUBJECTIVE:  I met with the patient in the evening at some length.  The patient

had slept for 3 hours previous night.  She has had some symptoms of upper

respiratory infection and states she is quite scared of another demented

patient, who hits out at other patients and gives this as the reason she could

not sleep at night.  She appeared somewhat tired, sedated, and we opted to

reduce the Risperdal from 0.5 mg at bedtime down to 0.25 mg p.o. at bedtime. 

The patient has also been dehydrated and has received IV fluids.  The valproic

acid level is 45.



REVIEW OF SYSTEMS:  Positive for some pedal edema and tiredness.  No CV, , GI,

eye system symptoms on review.



MENTAL STATUS EXAM:  Reasonably oriented.  Speech is coherent, still somewhat

pressured, less so than before.  Abstraction fair, computation impaired,

language function intact, attention span short.  Mood and affect remain somewhat

grandiosed, but better than before.



LABORATORY DATA:  Reviewed.



IMPRESSION:  Bipolar 1 disorder, mixed; anxiety disorder, unspecified.



PLAN:  Increase Depakote ER to 750 mg p.o. at bedtime.  Check CBC, CMP, valproic

acid level in 3 days.  Reduce the Risperdal down to 0.25 mg p.o. at bedtime at

least for one night and then decide after that.  Rest unchanged.





______________________________

MAN RADHA MANN MD



DR:  KIKI/domitila  JOB#:  [id number] / [id number]

DD:  11/12/2018 17:59  DT:  11/12/2018 18:59

## 2018-11-12 NOTE — RAD
Examination: PORTABLE CHEST 1V

 

History: PORTABLE CHEST 1 VIEW  congestion

 

Comparison/Correlation: None

 

Findings: Portable upright frontal view of the chest was obtained. Heart 

size and pulmonary vasculature are normal. No infiltrate or significant 

pleural effusion. No pneumothorax. Partially visualized lumbar spine rods 

and screws noted.

 

 

Impression:

No infiltrate.

 

Electronically signed by: Bharat Craven MD (11/12/2018 12:43 PM) [facility]

## 2018-11-12 NOTE — PDOC
Exam


Note:


Anjum Note:


Please also refer to the separate dictated note~for this date of service 

dictated separately.~Patient seen individually. Discussed the patient with 

Nursing staff reviewed the chart.~Reviewed interim history and current 

functioning. Reviewed vital signs,~Labs/ Radiology~and current medications 

noted below. Continue current treatment with the changes noted in the dictated 

addendum note





Assessment:


Vital Signs:





 Vital Signs








  Date Time  Temp Pulse Resp B/P (MAP) Pulse Ox O2 Delivery O2 Flow Rate FiO2


 


11/12/18 16:12 98.7 96 18 134/84 (101) 100   


 


11/12/18 01:44      Room Air  








I&O











Intake and Output 


 


 11/12/18





 07:00


 


Intake Total 1200 ml


 


Balance 1200 ml


 


 


 


Intake Oral 1200 ml








Labs:





 Laboratory Tests








Test


 11/12/18


06:56


 


Sodium Level


 135 mmol/L


(136-145)  L


 


Potassium Level


 4.0 mmol/L


(3.5-5.1)


 


Chloride Level


 101 mmol/L


()


 


Carbon Dioxide Level


 24 mmol/L


(21-32)


 


Anion Gap 10 (6-14)  


 


Blood Urea Nitrogen


 6 mg/dL (7-20)


L


 


Creatinine


 0.6 mg/dL


(0.6-1.0)


 


Estimated GFR


(Cockcroft-Gault) 98.8  





 


Glucose Level


 111 mg/dL


(70-99)  H


 


Calcium Level


 8.5 mg/dL


(8.5-10.1)











Current Medications:


Meds:





Current Medications


Acetaminophen (Tylenol) 650 mg PRN Q6HRS  PRN PO PAIN / TEMP Last administered 

on 11/12/18at 16:37;  Start 11/7/18 at 02:15


Multi-Ingredient Ointment (Analgesic Balm) 1 madalyn PRN QID  PRN TP MUSCLE PAIN 

Last administered on 11/8/18at 14:33;  Start 11/7/18 at 02:15


Al Hydroxide/Mg Hydroxide (Mylanta Plus Xs) 15 ml PRN AFTMEALHC  PRN PO 

DYSPEPSIA;  Start 11/7/18 at 02:15


Magnesium Hydroxide (Milk Of Magnesia) 2,400 mg PRN QHS  PRN PO CONSTIPATION;  

Start 11/7/18 at 02:15


Albuterol Sulfate (Ventolin Hfa Inhaler) 2 puff PRN Q4HRS  PRN INH SHORTNESS OF 

BREATH;  Start 11/7/18 at 09:45;  Stop 11/7/18 at 11:04;  Status DC


Clonazepam (KlonoPIN) 0.25 mg PRN DAILY  PRN PO ANXIETY Last administered on 11/ 12/18at 01:30;  Start 11/7/18 at 09:45


Vitamin D (Vitamin D3) 1,000 unit DAILY PO  Last administered on 11/12/18at 09:

32;  Start 11/8/18 at 09:00


Lisinopril (Prinivil) 20 mg DAILY PO  Last administered on 11/11/18at 09:42;  

Start 11/7/18 at 10:00


Melatonin 9 mg QHS PO  Last administered on 11/12/18at 19:33;  Start 11/7/18 at 

21:00


Multivitamins/ Calcium (Thera-M Plus) 1 tab DAILY PO  Last administered on 11/12 /18at 09:31;  Start 11/8/18 at 09:00


Quetiapine Fumarate (SEROquel) 25 mg QHS PO  Last administered on 11/7/18at 20:

45;  Start 11/7/18 at 10:00;  Stop 11/8/18 at 07:23;  Status DC


Acetaminophen (Tylenol) 500 mg BID PO  Last administered on 11/12/18at 19:34;  

Start 11/7/18 at 21:00


Hydrochlorothiazide (Microzide) 12.5 mg DAILY PO  Last administered on 11/11/ 18at 09:42;  Start 11/7/18 at 10:00;  Stop 11/11/18 at 22:59;  Status DC


Albuterol Sulfate (Ventolin) 2.5 mg PRN Q4HRS  PRN NEB SHORTNESS OF BREATH Last 

administered on 11/12/18at 01:41;  Start 11/7/18 at 11:00


Olanzapine (ZyPREXA ZYDIS) 2.5 mg PRN Q2HR  PRN PO PSYCHOSIS Last administered 

on 11/11/18at 04:55;  Start 11/7/18 at 19:30


Divalproex Sodium (Depakote Er) 500 mg DAILY PO  Last administered on 11/8/18at 

08:13;  Start 11/8/18 at 08:00;  Stop 11/8/18 at 19:16;  Status DC


Risperidone (RisperDAL) 0.25 mg DAILY PO  Last administered on 11/8/18at 08:11;

  Start 11/8/18 at 09:00;  Stop 11/8/18 at 19:16;  Status DC


Divalproex Sodium (Depakote Er) 500 mg QHS PO  Last administered on 11/10/18at 

19:18;  Start 11/9/18 at 18:45;  Stop 11/11/18 at 18:19;  Status DC


Risperidone (RisperDAL) 0.25 mg QHS PO ;  Start 11/9/18 at 21:00;  Stop 11/9/18 

at 21:00;  Status DC


Polyethylene Glycol (miraLAX) 17 gm PRN DAILY  PRN PO CONSTIPATION;  Start 11/8/ 18 at 21:15


Glycerin (Sani-Supp Adult) 1 supp PRN DAILY  PRN DE constipation;  Start 11/8/ 18 at 21:15


Lidocaine (Lidoderm) 1 patch DAILY TD  Last administered on 11/12/18at 09:32;  

Start 11/9/18 at 09:00


Miscellaneous (Lidoderm Patch Removal) 1 ea QHS MC  Last administered on 11/12/ 18at 20:01;  Start 11/8/18 at 21:00


Risperidone (RisperDAL) 0.5 mg QHS PO  Last administered on 11/10/18at 19:18;  

Start 11/9/18 at 18:45;  Stop 11/11/18 at 20:14;  Status DC


Trazodone HCl (Desyrel) 50 mg QHS PO  Last administered on 11/12/18at 19:34;  

Start 11/9/18 at 21:00


Trazodone HCl (Desyrel) 50 mg PRN QHS  PRN PO INSOMNIA Last administered on 11/

10/18at 21:45;  Start 11/9/18 at 16:45


Docusate Sodium (Colace) 100 mg BID PO  Last administered on 11/12/18at 19:34;  

Start 11/9/18 at 18:45


Throat Lozenges (Cepacol Sore Throat Lozenge) 1 missy PRN Q2HR  PRN PO SORE 

THROAT Last administered on 11/12/18at 06:21;  Start 11/10/18 at 17:45


Sodium Chloride 1,000 ml @  500 mls/hr 1X  ONCE IV  Last administered on 11/11/ 18at 17:33;  Start 11/11/18 at 16:30;  Stop 11/11/18 at 18:29;  Status DC


Divalproex Sodium (Depakote Er) 750 mg QHS PO  Last administered on 11/12/18at 

19:33;  Start 11/11/18 at 21:00


Risperidone (RisperDAL) 0.25 mg QHS PO  Last administered on 11/11/18at 20:52;  

Start 11/11/18 at 21:00;  Stop 11/12/18 at 14:57;  Status DC


Lidocaine (Lidoderm) 1 patch DAILY TD ;  Start 11/12/18 at 09:00


Miscellaneous (Lidoderm Patch Removal) 1 ea QHS MC  Last administered on 11/12/ 18at 20:01;  Start 11/11/18 at 21:00


Sodium Chloride 1,000 ml @  150 mls/hr Q6H40M IV  Last administered on 11/11/ 18at 23:18;  Start 11/11/18 at 23:00;  Stop 11/12/18 at 05:40;  Status DC


Guaifenesin (Mucinex Er) 600 mg BID PO  Last administered on 11/12/18at 19:37;  

Start 11/12/18 at 21:00;  Stop 11/19/18 at 20:59


Risperidone (RisperDAL) 0.75 mg QHS PO  Last administered on 11/12/18at 19:37;  

Start 11/12/18 at 21:00


Mirtazapine (Remeron) 7.5 mg QHS PO  Last administered on 11/12/18at 19:37;  

Start 11/12/18 at 21:00





Active Scripts


Active


Reported


Centrum Adults Tablet (Multivitamin/Iron/Folic Acid) 1 Each Tablet 1 Each PO 

DAILY


Vitamin D (Cholecalciferol (Vitamin D3)) 1,000 Unit Capsule 1,000 Unit PO DAILY


Proair Hfa Inhaler (Albuterol Sulfate) 8.5 Gm Hfa.aer.ad 2 Puff INH PRN Q4HRS 

PRN


Tylenol (Acetaminophen) 325 Mg Tablet 500 Mg PO BID


Clonazepam 0.5 Mg Tablet 0.25 Mg PO PRN DAILY PRN


Melatonin 3 Mg Tablet 10 Mg PO QHS


Seroquel (Quetiapine Fumarate) 25 Mg Tablet 25 Mg PO QHS


Lisinopril-Hctz 20-12.5 Mg Tab (Lisinopril/Hydrochlorothiazide) 1 Each Tablet 1 

Tab PO DAILY


I have reviewed the current psychotropics carefully including drug 

interactions.  Risk benefit ratio favors no change other than as noted in my 

dictated progress note.





Diagnosis:


Problems:  


(1) Anxiety disorder


(2) Bipolar affective, mixed, sev w/ psych


(3) Impulse control disorder











SOFÍA MANN MD Nov 12, 2018 23:11

## 2018-11-12 NOTE — EKG
[facility] [address], [place] [address]

Test Date:    2018               Test Time:    20:16:56

Pat Name:     ALCIRA CARRILLO               Department:   

Patient ID:   [id number]           Room:         [facility] 1

Gender:                                Technician:   

:          1945               Requested By: SOFÍA MANN

Order Number: [id number]            Reading MD:   Stanford Sheppard MD

                                 Measurements

Intervals                              Axis          

Rate:                                  P:            

VA:                                    QRS:          

QRSD:                                  T:            

QT:                                                  

QTc:                                                 

                           Interpretive Statements

SR

BASELINE ARTIFACT

CANNOT RULE OUT OLD SEPTAL INFARCT



Electronically Signed On 2018 10:02:20 CST by Stanford Sheppard MD

## 2018-11-13 VITALS — DIASTOLIC BLOOD PRESSURE: 81 MMHG | SYSTOLIC BLOOD PRESSURE: 124 MMHG

## 2018-11-13 VITALS — DIASTOLIC BLOOD PRESSURE: 66 MMHG | SYSTOLIC BLOOD PRESSURE: 113 MMHG

## 2018-11-13 VITALS — SYSTOLIC BLOOD PRESSURE: 108 MMHG | DIASTOLIC BLOOD PRESSURE: 73 MMHG

## 2018-11-13 LAB
ANION GAP SERPL CALC-SCNC: 10 MMOL/L (ref 6–14)
CA-I SERPL ISE-MCNC: 7 MG/DL (ref 7–20)
CALCIUM SERPL-MCNC: 9.2 MG/DL (ref 8.5–10.1)
CHLORIDE SERPL-SCNC: 101 MMOL/L (ref 98–107)
CO2 SERPL-SCNC: 27 MMOL/L (ref 21–32)
CREAT SERPL-MCNC: 0.6 MG/DL (ref 0.6–1)
GFR SERPLBLD BASED ON 1.73 SQ M-ARVRAT: 98.8 ML/MIN
GLUCOSE SERPL-MCNC: 97 MG/DL (ref 70–99)
POTASSIUM SERPL-SCNC: 3.8 MMOL/L (ref 3.5–5.1)
SODIUM SERPL-SCNC: 138 MMOL/L (ref 136–145)

## 2018-11-13 RX ADMIN — TRAZODONE HYDROCHLORIDE SCH MG: 50 TABLET, FILM COATED ORAL at 20:02

## 2018-11-13 RX ADMIN — DOCUSATE SODIUM SCH MG: 100 CAPSULE, LIQUID FILLED ORAL at 09:16

## 2018-11-13 RX ADMIN — LISINOPRIL SCH MG: 20 TABLET ORAL at 09:16

## 2018-11-13 RX ADMIN — DIVALPROEX SODIUM SCH MG: 250 TABLET, FILM COATED, EXTENDED RELEASE ORAL at 20:02

## 2018-11-13 RX ADMIN — LIDOCAINE SCH PATCH: 50 PATCH CUTANEOUS at 09:22

## 2018-11-13 RX ADMIN — Medication SCH EA: at 20:06

## 2018-11-13 RX ADMIN — LIDOCAINE SCH PATCH: 50 PATCH CUTANEOUS at 09:00

## 2018-11-13 RX ADMIN — DOCUSATE SODIUM SCH MG: 100 CAPSULE, LIQUID FILLED ORAL at 20:00

## 2018-11-13 RX ADMIN — ACETAMINOPHEN SCH MG: 325 TABLET, FILM COATED ORAL at 20:03

## 2018-11-13 RX ADMIN — ACETAMINOPHEN SCH MG: 325 TABLET, FILM COATED ORAL at 09:22

## 2018-11-13 RX ADMIN — LIDOCAINE SCH PATCH: 50 PATCH CUTANEOUS at 09:18

## 2018-11-13 RX ADMIN — MELATONIN TAB 3 MG SCH MG: 3 TAB at 20:00

## 2018-11-13 RX ADMIN — MULTIPLE VITAMINS W/ MINERALS TAB SCH TAB: TAB at 09:16

## 2018-11-13 RX ADMIN — MIRTAZAPINE SCH MG: 7.5 TABLET, FILM COATED ORAL at 20:02

## 2018-11-13 RX ADMIN — RISPERIDONE SCH MG: 0.25 TABLET ORAL at 20:00

## 2018-11-13 RX ADMIN — VITAMIN D, TAB 1000IU (100/BT) SCH UNIT: 25 TAB at 09:17

## 2018-11-13 NOTE — PDOC
Exam


Note:


Anjum Note:


Please also refer to the separate dictated note~for this date of service 

dictated separately.~Patient seen individually. Discussed the patient with 

Nursing staff reviewed the chart.~Reviewed interim history and current 

functioning. Reviewed vital signs,~Labs/ Radiology~and current medications 

noted below. Continue current treatment with the changes noted in the dictated 

addendum note





Assessment:


Vital Signs:





 Vital Signs








  Date Time  Temp Pulse Resp B/P (MAP) Pulse Ox O2 Delivery O2 Flow Rate FiO2


 


11/13/18 15:34 96.5 88 18 124/81 (95) 100 Room Air  








I&O











Intake and Output 


 


 11/13/18





 07:00


 


Intake Total 1000 ml


 


Balance 1000 ml


 


 


 


Intake Oral 1000 ml


 


# Voids 1








Labs:





 Laboratory Tests








Test


 11/13/18


06:16


 


Sodium Level


 138 mmol/L


(136-145)


 


Potassium Level


 3.8 mmol/L


(3.5-5.1)


 


Chloride Level


 101 mmol/L


()


 


Carbon Dioxide Level


 27 mmol/L


(21-32)


 


Anion Gap 10 (6-14)  


 


Blood Urea Nitrogen


 7 mg/dL (7-20)





 


Creatinine


 0.6 mg/dL


(0.6-1.0)


 


Estimated GFR


(Cockcroft-Gault) 98.8  





 


Glucose Level


 97 mg/dL


(70-99)


 


Calcium Level


 9.2 mg/dL


(8.5-10.1)











Current Medications:


Meds:





Current Medications


Acetaminophen (Tylenol) 650 mg PRN Q6HRS  PRN PO PAIN / TEMP Last administered 

on 11/12/18at 16:37;  Start 11/7/18 at 02:15


Multi-Ingredient Ointment (Analgesic Balm) 1 madalyn PRN QID  PRN TP MUSCLE PAIN 

Last administered on 11/8/18at 14:33;  Start 11/7/18 at 02:15


Al Hydroxide/Mg Hydroxide (Mylanta Plus Xs) 15 ml PRN AFTMEALHC  PRN PO 

DYSPEPSIA;  Start 11/7/18 at 02:15


Magnesium Hydroxide (Milk Of Magnesia) 2,400 mg PRN QHS  PRN PO CONSTIPATION;  

Start 11/7/18 at 02:15


Albuterol Sulfate (Ventolin Hfa Inhaler) 2 puff PRN Q4HRS  PRN INH SHORTNESS OF 

BREATH;  Start 11/7/18 at 09:45;  Stop 11/7/18 at 11:04;  Status DC


Clonazepam (KlonoPIN) 0.25 mg PRN DAILY  PRN PO ANXIETY Last administered on 11/ 13/18at 14:33;  Start 11/7/18 at 09:45


Vitamin D (Vitamin D3) 1,000 unit DAILY PO  Last administered on 11/13/18at 09:

17;  Start 11/8/18 at 09:00


Lisinopril (Prinivil) 20 mg DAILY PO  Last administered on 11/13/18at 09:16;  

Start 11/7/18 at 10:00


Melatonin 9 mg QHS PO  Last administered on 11/13/18at 20:00;  Start 11/7/18 at 

21:00


Multivitamins/ Calcium (Thera-M Plus) 1 tab DAILY PO  Last administered on 11/13 /18at 09:16;  Start 11/8/18 at 09:00


Quetiapine Fumarate (SEROquel) 25 mg QHS PO  Last administered on 11/7/18at 20:

45;  Start 11/7/18 at 10:00;  Stop 11/8/18 at 07:23;  Status DC


Acetaminophen (Tylenol) 500 mg BID PO  Last administered on 11/13/18at 20:03;  

Start 11/7/18 at 21:00


Hydrochlorothiazide (Microzide) 12.5 mg DAILY PO  Last administered on 11/11/ 18at 09:42;  Start 11/7/18 at 10:00;  Stop 11/11/18 at 22:59;  Status DC


Albuterol Sulfate (Ventolin) 2.5 mg PRN Q4HRS  PRN NEB SHORTNESS OF BREATH Last 

administered on 11/12/18at 01:41;  Start 11/7/18 at 11:00


Olanzapine (ZyPREXA ZYDIS) 2.5 mg PRN Q2HR  PRN PO PSYCHOSIS Last administered 

on 11/11/18at 04:55;  Start 11/7/18 at 19:30


Divalproex Sodium (Depakote Er) 500 mg DAILY PO  Last administered on 11/8/18at 

08:13;  Start 11/8/18 at 08:00;  Stop 11/8/18 at 19:16;  Status DC


Risperidone (RisperDAL) 0.25 mg DAILY PO  Last administered on 11/8/18at 08:11;

  Start 11/8/18 at 09:00;  Stop 11/8/18 at 19:16;  Status DC


Divalproex Sodium (Depakote Er) 500 mg QHS PO  Last administered on 11/10/18at 

19:18;  Start 11/9/18 at 18:45;  Stop 11/11/18 at 18:19;  Status DC


Risperidone (RisperDAL) 0.25 mg QHS PO ;  Start 11/9/18 at 21:00;  Stop 11/9/18 

at 21:00;  Status DC


Polyethylene Glycol (miraLAX) 17 gm PRN DAILY  PRN PO CONSTIPATION;  Start 11/8/ 18 at 21:15


Glycerin (Sani-Supp Adult) 1 supp PRN DAILY  PRN DE constipation;  Start 11/8/ 18 at 21:15


Lidocaine (Lidoderm) 1 patch DAILY TD  Last administered on 11/12/18at 09:32;  

Start 11/9/18 at 09:00


Miscellaneous (Lidoderm Patch Removal) 1 ea QHS MC  Last administered on 11/13/ 18at 20:06;  Start 11/8/18 at 21:00


Risperidone (RisperDAL) 0.5 mg QHS PO  Last administered on 11/10/18at 19:18;  

Start 11/9/18 at 18:45;  Stop 11/11/18 at 20:14;  Status DC


Trazodone HCl (Desyrel) 50 mg QHS PO  Last administered on 11/13/18at 20:02;  

Start 11/9/18 at 21:00


Trazodone HCl (Desyrel) 50 mg PRN QHS  PRN PO INSOMNIA Last administered on 11/

10/18at 21:45;  Start 11/9/18 at 16:45


Docusate Sodium (Colace) 100 mg BID PO  Last administered on 11/13/18at 20:00;  

Start 11/9/18 at 18:45


Throat Lozenges (Cepacol Sore Throat Lozenge) 1 missy PRN Q2HR  PRN PO SORE 

THROAT Last administered on 11/12/18at 06:21;  Start 11/10/18 at 17:45


Sodium Chloride 1,000 ml @  500 mls/hr 1X  ONCE IV  Last administered on 11/11/ 18at 17:33;  Start 11/11/18 at 16:30;  Stop 11/11/18 at 18:29;  Status DC


Divalproex Sodium (Depakote Er) 750 mg QHS PO  Last administered on 11/13/18at 

20:02;  Start 11/11/18 at 21:00


Risperidone (RisperDAL) 0.25 mg QHS PO  Last administered on 11/11/18at 20:52;  

Start 11/11/18 at 21:00;  Stop 11/12/18 at 14:57;  Status DC


Lidocaine (Lidoderm) 1 patch DAILY TD ;  Start 11/12/18 at 09:00


Miscellaneous (Lidoderm Patch Removal) 1 ea QHS MC  Last administered on 11/13/ 18at 20:06;  Start 11/11/18 at 21:00


Sodium Chloride 1,000 ml @  150 mls/hr Q6H40M IV  Last administered on 11/11/ 18at 23:18;  Start 11/11/18 at 23:00;  Stop 11/12/18 at 05:40;  Status DC


Guaifenesin (Mucinex Er) 600 mg BID PO  Last administered on 11/13/18at 20:00;  

Start 11/12/18 at 21:00;  Stop 11/19/18 at 20:59


Risperidone (RisperDAL) 0.75 mg QHS PO  Last administered on 11/13/18at 20:00;  

Start 11/12/18 at 21:00


Mirtazapine (Remeron) 7.5 mg QHS PO  Last administered on 11/13/18at 20:02;  

Start 11/12/18 at 21:00





Active Scripts


Active


Reported


Centrum Adults Tablet (Multivitamin/Iron/Folic Acid) 1 Each Tablet 1 Each PO 

DAILY


Vitamin D (Cholecalciferol (Vitamin D3)) 1,000 Unit Capsule 1,000 Unit PO DAILY


Proair Hfa Inhaler (Albuterol Sulfate) 8.5 Gm Hfa.aer.ad 2 Puff INH PRN Q4HRS 

PRN


Tylenol (Acetaminophen) 325 Mg Tablet 500 Mg PO BID


Clonazepam 0.5 Mg Tablet 0.25 Mg PO PRN DAILY PRN


Melatonin 3 Mg Tablet 10 Mg PO QHS


Seroquel (Quetiapine Fumarate) 25 Mg Tablet 25 Mg PO QHS


Lisinopril-Hctz 20-12.5 Mg Tab (Lisinopril/Hydrochlorothiazide) 1 Each Tablet 1 

Tab PO DAILY


I have reviewed the current psychotropics carefully including drug 

interactions.  Risk benefit ratio favors no change other than as noted in my 

dictated progress note.





Diagnosis:


Problems:  


(1) Anxiety disorder


(2) Bipolar affective, mixed, sev w/ psych


(3) Impulse control disorder











SOFÍA MANN MD Nov 13, 2018 22:47

## 2018-11-13 NOTE — PN
DATE:  11/12/2018



PSYCHIATRIC PROGRESS NOTE



This late entry 11/12/2018 covers elements not covered in my initial note.



SUBJECTIVE:  I met with the patient in the early afternoon.  The patient slept

just 3/4 hours previous night.  This is because she states she is afraid of one

of the other demented patients, who is actually physically struck out at other

patients on the unit.  She has been obsessive about using the phone fiddling

with her IV.  Chest x-ray is negative.  She continues to have cough and upper

respiratory tract infection symptoms.  Electrolytes are better with bolus of 500

mL fluid.



REVIEW OF SYSTEMS:  Positive for the cough, tiredness.  No CV, , GI, eye

system symptoms on review.



MENTAL STATUS EXAM:  Reasonably oriented.  Speech is coherent, still somewhat

pressured, but much less than before.  Abstraction fair, computation impaired,

language function intact, attention span short.  Mood and affect remain somewhat

hypomanic, but improved.



LABORATORY DATA:  Reviewed.



IMPRESSION:  Bipolar 1 disorder, mixed anxiety disorder, unspecified.  Rest

unchanged.



PLAN:  Increase the Risperdal back from 0.25 mg at bedtime to 0.5 mg at bedtime,

start Remeron 7.5 mg at bedtime to help with insomnia.  Continue Depakote 

at bedtime.  Labs and level awaited on 11/14/2018.  Rest unchanged from initial

note.





______________________________

MAN RADHA MANN MD



DR:  [email or web address]  JOB#:  [id number] / [id number]

DD:  11/13/2018 09:43  DT:  11/13/2018 20:55

## 2018-11-14 VITALS — DIASTOLIC BLOOD PRESSURE: 66 MMHG | SYSTOLIC BLOOD PRESSURE: 106 MMHG

## 2018-11-14 VITALS — DIASTOLIC BLOOD PRESSURE: 57 MMHG | SYSTOLIC BLOOD PRESSURE: 116 MMHG

## 2018-11-14 LAB
ALBUMIN SERPL-MCNC: 3.1 G/DL (ref 3.4–5)
ALBUMIN/GLOB SERPL: 0.9 {RATIO} (ref 1–1.7)
ALP SERPL-CCNC: 78 U/L (ref 46–116)
ALT SERPL-CCNC: 41 U/L (ref 14–59)
ANION GAP SERPL CALC-SCNC: 7 MMOL/L (ref 6–14)
AST SERPL-CCNC: 23 U/L (ref 15–37)
BASOPHILS # BLD AUTO: 0 X10^3/UL (ref 0–0.2)
BASOPHILS NFR BLD: 1 % (ref 0–3)
BILIRUB SERPL-MCNC: 0.3 MG/DL (ref 0.2–1)
BUN/CREAT SERPL: 18 (ref 6–20)
CA-I SERPL ISE-MCNC: 11 MG/DL (ref 7–20)
CALCIUM SERPL-MCNC: 9.2 MG/DL (ref 8.5–10.1)
CHLORIDE SERPL-SCNC: 99 MMOL/L (ref 98–107)
CO2 SERPL-SCNC: 28 MMOL/L (ref 21–32)
CREAT SERPL-MCNC: 0.6 MG/DL (ref 0.6–1)
EOSINOPHIL NFR BLD: 0.2 X10^3/UL (ref 0–0.7)
EOSINOPHIL NFR BLD: 3 % (ref 0–3)
ERYTHROCYTE [DISTWIDTH] IN BLOOD BY AUTOMATED COUNT: 12.5 % (ref 11.5–14.5)
GFR SERPLBLD BASED ON 1.73 SQ M-ARVRAT: 98.8 ML/MIN
GLOBULIN SER-MCNC: 3.4 G/DL (ref 2.2–3.8)
GLUCOSE SERPL-MCNC: 95 MG/DL (ref 70–99)
HCT VFR BLD CALC: 40.1 % (ref 36–47)
HGB BLD-MCNC: 13.6 G/DL (ref 12–15.5)
LYMPHOCYTES # BLD: 1 X10^3/UL (ref 1–4.8)
LYMPHOCYTES NFR BLD AUTO: 15 % (ref 24–48)
MCH RBC QN AUTO: 32 PG (ref 25–35)
MCHC RBC AUTO-ENTMCNC: 34 G/DL (ref 31–37)
MCV RBC AUTO: 95 FL (ref 79–100)
MONO #: 0.7 X10^3/UL (ref 0–1.1)
MONOCYTES NFR BLD: 10 % (ref 0–9)
NEUT #: 5.1 X10^3UL (ref 1.8–7.7)
NEUTROPHILS NFR BLD AUTO: 72 % (ref 31–73)
PLATELET # BLD AUTO: 243 X10^3/UL (ref 140–400)
POTASSIUM SERPL-SCNC: 3.9 MMOL/L (ref 3.5–5.1)
PROT SERPL-MCNC: 6.5 G/DL (ref 6.4–8.2)
RBC # BLD AUTO: 4.24 X10^6/UL (ref 3.5–5.4)
SODIUM SERPL-SCNC: 134 MMOL/L (ref 136–145)
VAL ACID: 64 MCG/ML (ref 50–100)
WBC # BLD AUTO: 7.1 X10^3/UL (ref 4–11)

## 2018-11-14 RX ADMIN — VITAMIN D, TAB 1000IU (100/BT) SCH UNIT: 25 TAB at 07:46

## 2018-11-14 RX ADMIN — MULTIPLE VITAMINS W/ MINERALS TAB SCH TAB: TAB at 07:47

## 2018-11-14 RX ADMIN — DOCUSATE SODIUM SCH MG: 100 CAPSULE, LIQUID FILLED ORAL at 19:23

## 2018-11-14 RX ADMIN — ACETAMINOPHEN SCH MG: 325 TABLET, FILM COATED ORAL at 07:47

## 2018-11-14 RX ADMIN — LISINOPRIL SCH MG: 20 TABLET ORAL at 07:46

## 2018-11-14 RX ADMIN — DOCUSATE SODIUM SCH MG: 100 CAPSULE, LIQUID FILLED ORAL at 07:45

## 2018-11-14 RX ADMIN — DIVALPROEX SODIUM SCH MG: 250 TABLET, FILM COATED, EXTENDED RELEASE ORAL at 19:23

## 2018-11-14 RX ADMIN — MELATONIN TAB 3 MG SCH MG: 3 TAB at 19:23

## 2018-11-14 RX ADMIN — RISPERIDONE SCH MG: 0.25 TABLET ORAL at 19:23

## 2018-11-14 RX ADMIN — LIDOCAINE SCH PATCH: 50 PATCH CUTANEOUS at 07:48

## 2018-11-14 RX ADMIN — Medication SCH EA: at 19:17

## 2018-11-14 RX ADMIN — MIRTAZAPINE SCH MG: 7.5 TABLET, FILM COATED ORAL at 19:24

## 2018-11-14 RX ADMIN — ACETAMINOPHEN SCH MG: 325 TABLET, FILM COATED ORAL at 21:27

## 2018-11-14 RX ADMIN — TRAZODONE HYDROCHLORIDE SCH MG: 50 TABLET, FILM COATED ORAL at 19:24

## 2018-11-14 NOTE — PDOC
Exam


Note:


Anjum Note:


Please also refer to the separate dictated note~for this date of service 

dictated separately.~Patient seen individually. Discussed the patient with 

Nursing staff reviewed the chart.~Reviewed interim history and current 

functioning. Reviewed vital signs,~Labs/ Radiology~and current medications 

noted below. Continue current treatment with the changes noted in the dictated 

addendum note





Assessment:


Vital Signs:





 Vital Signs








  Date Time  Temp Pulse Resp B/P (MAP) Pulse Ox O2 Delivery O2 Flow Rate FiO2


 


11/14/18 16:56 98.1 80 20 106/66 (79) 99 Room Air  








I&O











Intake and Output 


 


 11/14/18





 07:00


 


Intake Total 1080 ml


 


Balance 1080 ml


 


 


 


Intake Oral 1080 ml


 


# Voids 1








Labs:





 Laboratory Tests








Test


 11/14/18


07:36 11/14/18


08:00


 


White Blood Count


 7.1 x10^3/uL


(4.0-11.0) 





 


Red Blood Count


 4.24 x10^6/uL


(3.50-5.40) 





 


Hemoglobin


 13.6 g/dL


(12.0-15.5) 





 


Hematocrit


 40.1 %


(36.0-47.0) 





 


Mean Corpuscular Volume


 95 fL ()


 





 


Mean Corpuscular Hemoglobin 32 pg (25-35)   


 


Mean Corpuscular Hemoglobin


Concent 34 g/dL


(31-37) 





 


Red Cell Distribution Width


 12.5 %


(11.5-14.5) 





 


Platelet Count


 243 x10^3/uL


(140-400) 





 


Neutrophils (%) (Auto) 72 % (31-73)   


 


Lymphocytes (%) (Auto) 15 % (24-48)  L 


 


Monocytes (%) (Auto) 10 % (0-9)  H 


 


Eosinophils (%) (Auto) 3 % (0-3)   


 


Basophils (%) (Auto) 1 % (0-3)   


 


Neutrophils # (Auto)


 5.1 x10^3uL


(1.8-7.7) 





 


Lymphocytes # (Auto)


 1.0 x10^3/uL


(1.0-4.8) 





 


Monocytes # (Auto)


 0.7 x10^3/uL


(0.0-1.1) 





 


Eosinophils # (Auto)


 0.2 x10^3/uL


(0.0-0.7) 





 


Basophils # (Auto)


 0.0 x10^3/uL


(0.0-0.2) 





 


Sodium Level


 134 mmol/L


(136-145)  L 





 


Potassium Level


 3.9 mmol/L


(3.5-5.1) 





 


Chloride Level


 99 mmol/L


() 





 


Carbon Dioxide Level


 28 mmol/L


(21-32) 





 


Anion Gap 7 (6-14)   


 


Blood Urea Nitrogen


 11 mg/dL


(7-20) 





 


Creatinine


 0.6 mg/dL


(0.6-1.0) 





 


Estimated GFR


(Cockcroft-Gault) 98.8  


 





 


BUN/Creatinine Ratio 18 (6-20)   


 


Glucose Level


 95 mg/dL


(70-99) 





 


Calcium Level


 9.2 mg/dL


(8.5-10.1) 





 


Total Bilirubin


 0.3 mg/dL


(0.2-1.0) 





 


Aspartate Amino Transferase


(AST) 23 U/L (15-37)


 





 


Alanine Aminotransferase (ALT)


 41 U/L (14-59)


 





 


Alkaline Phosphatase


 78 U/L


() 





 


Total Protein


 6.5 g/dL


(6.4-8.2) 





 


Albumin


 3.1 g/dL


(3.4-5.0)  L 





 


Albumin/Globulin Ratio


 0.9 (1.0-1.7)


L 





 


Valproic Acid Level


 64 mcg/mL


() 





 


Valproic Acid Last Dose Date 11/13/18   


 


Valproic Acid Last Dose Time 2100   


 


NT-Pro-B-Type Natriuretic


Peptide 


 120 pg/mL


(0-124)











Current Medications:


Meds:





Current Medications


Acetaminophen (Tylenol) 650 mg PRN Q6HRS  PRN PO PAIN / TEMP Last administered 

on 11/12/18at 16:37;  Start 11/7/18 at 02:15


Multi-Ingredient Ointment (Analgesic Balm) 1 madalyn PRN QID  PRN TP MUSCLE PAIN 

Last administered on 11/8/18at 14:33;  Start 11/7/18 at 02:15


Al Hydroxide/Mg Hydroxide (Mylanta Plus Xs) 15 ml PRN AFTMEALHC  PRN PO 

DYSPEPSIA;  Start 11/7/18 at 02:15


Magnesium Hydroxide (Milk Of Magnesia) 2,400 mg PRN QHS  PRN PO CONSTIPATION;  

Start 11/7/18 at 02:15


Albuterol Sulfate (Ventolin Hfa Inhaler) 2 puff PRN Q4HRS  PRN INH SHORTNESS OF 

BREATH;  Start 11/7/18 at 09:45;  Stop 11/7/18 at 11:04;  Status DC


Clonazepam (KlonoPIN) 0.25 mg PRN DAILY  PRN PO ANXIETY Last administered on 11/ 14/18at 14:33;  Start 11/7/18 at 09:45


Vitamin D (Vitamin D3) 1,000 unit DAILY PO  Last administered on 11/14/18at 07:

46;  Start 11/8/18 at 09:00


Lisinopril (Prinivil) 20 mg DAILY PO  Last administered on 11/14/18at 07:46;  

Start 11/7/18 at 10:00


Melatonin 9 mg QHS PO  Last administered on 11/14/18at 19:23;  Start 11/7/18 at 

21:00


Multivitamins/ Calcium (Thera-M Plus) 1 tab DAILY PO  Last administered on 11/14 /18at 07:47;  Start 11/8/18 at 09:00


Quetiapine Fumarate (SEROquel) 25 mg QHS PO  Last administered on 11/7/18at 20:

45;  Start 11/7/18 at 10:00;  Stop 11/8/18 at 07:23;  Status DC


Acetaminophen (Tylenol) 500 mg BID PO  Last administered on 11/14/18at 21:27;  

Start 11/7/18 at 21:00


Hydrochlorothiazide (Microzide) 12.5 mg DAILY PO  Last administered on 11/11/ 18at 09:42;  Start 11/7/18 at 10:00;  Stop 11/11/18 at 22:59;  Status DC


Albuterol Sulfate (Ventolin) 2.5 mg PRN Q4HRS  PRN NEB SHORTNESS OF BREATH Last 

administered on 11/12/18at 01:41;  Start 11/7/18 at 11:00


Olanzapine (ZyPREXA ZYDIS) 2.5 mg PRN Q2HR  PRN PO PSYCHOSIS Last administered 

on 11/11/18at 04:55;  Start 11/7/18 at 19:30


Divalproex Sodium (Depakote Er) 500 mg DAILY PO  Last administered on 11/8/18at 

08:13;  Start 11/8/18 at 08:00;  Stop 11/8/18 at 19:16;  Status DC


Risperidone (RisperDAL) 0.25 mg DAILY PO  Last administered on 11/8/18at 08:11;

  Start 11/8/18 at 09:00;  Stop 11/8/18 at 19:16;  Status DC


Divalproex Sodium (Depakote Er) 500 mg QHS PO  Last administered on 11/10/18at 

19:18;  Start 11/9/18 at 18:45;  Stop 11/11/18 at 18:19;  Status DC


Risperidone (RisperDAL) 0.25 mg QHS PO ;  Start 11/9/18 at 21:00;  Stop 11/9/18 

at 21:00;  Status DC


Polyethylene Glycol (miraLAX) 17 gm PRN DAILY  PRN PO CONSTIPATION;  Start 11/8/ 18 at 21:15


Glycerin (Sani-Supp Adult) 1 supp PRN DAILY  PRN MA constipation;  Start 11/8/ 18 at 21:15


Lidocaine (Lidoderm) 1 patch DAILY TD  Last administered on 11/14/18at 07:48;  

Start 11/9/18 at 09:00


Miscellaneous (Lidoderm Patch Removal) 1 ea QHS MC  Last administered on 11/14/ 18at 19:17;  Start 11/8/18 at 21:00


Risperidone (RisperDAL) 0.5 mg QHS PO  Last administered on 11/10/18at 19:18;  

Start 11/9/18 at 18:45;  Stop 11/11/18 at 20:14;  Status DC


Trazodone HCl (Desyrel) 50 mg QHS PO  Last administered on 11/14/18at 19:24;  

Start 11/9/18 at 21:00


Trazodone HCl (Desyrel) 50 mg PRN QHS  PRN PO INSOMNIA Last administered on 11/

10/18at 21:45;  Start 11/9/18 at 16:45


Docusate Sodium (Colace) 100 mg BID PO  Last administered on 11/14/18at 19:23;  

Start 11/9/18 at 18:45


Throat Lozenges (Cepacol Sore Throat Lozenge) 1 missy PRN Q2HR  PRN PO SORE 

THROAT Last administered on 11/12/18at 06:21;  Start 11/10/18 at 17:45


Sodium Chloride 1,000 ml @  500 mls/hr 1X  ONCE IV  Last administered on 11/11/ 18at 17:33;  Start 11/11/18 at 16:30;  Stop 11/11/18 at 18:29;  Status DC


Divalproex Sodium (Depakote Er) 750 mg QHS PO  Last administered on 11/14/18at 

19:23;  Start 11/11/18 at 21:00


Risperidone (RisperDAL) 0.25 mg QHS PO  Last administered on 11/11/18at 20:52;  

Start 11/11/18 at 21:00;  Stop 11/12/18 at 14:57;  Status DC


Lidocaine (Lidoderm) 1 patch DAILY TD ;  Start 11/12/18 at 09:00


Miscellaneous (Lidoderm Patch Removal) 1 ea QHS MC  Last administered on 11/14/ 18at 19:17;  Start 11/11/18 at 21:00


Sodium Chloride 1,000 ml @  150 mls/hr Q6H40M IV  Last administered on 11/11/ 18at 23:18;  Start 11/11/18 at 23:00;  Stop 11/12/18 at 05:40;  Status DC


Guaifenesin (Mucinex Er) 600 mg BID PO  Last administered on 11/14/18at 19:24;  

Start 11/12/18 at 21:00;  Stop 11/19/18 at 20:59


Risperidone (RisperDAL) 0.75 mg QHS PO  Last administered on 11/14/18at 19:23;  

Start 11/12/18 at 21:00


Mirtazapine (Remeron) 7.5 mg QHS PO  Last administered on 11/14/18at 19:24;  

Start 11/12/18 at 21:00





Active Scripts


Active


Reported


Centrum Adults Tablet (Multivitamin/Iron/Folic Acid) 1 Each Tablet 1 Each PO 

DAILY


Vitamin D (Cholecalciferol (Vitamin D3)) 1,000 Unit Capsule 1,000 Unit PO DAILY


Proair Hfa Inhaler (Albuterol Sulfate) 8.5 Gm Hfa.aer.ad 2 Puff INH PRN Q4HRS 

PRN


Tylenol (Acetaminophen) 325 Mg Tablet 500 Mg PO BID


Clonazepam 0.5 Mg Tablet 0.25 Mg PO PRN DAILY PRN


Melatonin 3 Mg Tablet 10 Mg PO QHS


Seroquel (Quetiapine Fumarate) 25 Mg Tablet 25 Mg PO QHS


Lisinopril-Hctz 20-12.5 Mg Tab (Lisinopril/Hydrochlorothiazide) 1 Each Tablet 1 

Tab PO DAILY


I have reviewed the current psychotropics carefully including drug 

interactions.  Risk benefit ratio favors no change other than as noted in my 

dictated progress note.





Diagnosis:


Problems:  


(1) Anxiety disorder


(2) Bipolar affective, mixed, sev w/ psych


(3) Impulse control disorder











SOFÍA MANN MD Nov 14, 2018 22:49

## 2018-11-14 NOTE — RAD
CHEST PA   LATERAL

 

History: COUGH, ORTHOPNEA

 

Comparison: November 12, 2018

 

Findings:

2 views of the chest are submitted. 

There is no infiltrate, pneumothorax, or effusion. The cardiac silhouette 

is within normal limits in size.

 

Impression: 

 

1.  No acute radiographic abnormality is identified.

 

Electronically signed by: Toan Murphy MD (11/14/2018 5:17 PM) 

[id number]

## 2018-11-14 NOTE — PN
DATE:  11/13/2018



PSYCHIATRIC PROGRESS NOTE



This late entry 11/13/2018 covers elements not covered in my initial note.



SUBJECTIVE:  I met with the patient at some length in her room.  The patient

slept 7-1/2 hours previous night.  Per nursing report, she remains hyperverbal,

attention seeking, writing copious notes about her medications, sometimes

repetitively even as I met with her in her room.  She forgets she has asked

those question, goes back to the nursing staff and to myself, asking same thing

repeatedly.  She remains somewhat hypomanic, but perhaps a little better than

before.  Slept 7-1/2 hours.



REVIEW OF SYSTEMS:  Positive for upper respiratory tract infection symptoms. 

Defer to Dr. Wen.  No CV, , GI, eye system symptoms on review.



MENTAL STATUS EXAM:  Reasonably oriented.  Speech is coherent, abstraction fair,

computation impaired, language function intact, attention span short.  Mood and

affect remain somewhat hypomanic.  No active suicidal or homicidal ideation.



LABORATORY DATA:  Reviewed.



IMPRESSION:  Bipolar 1 disorder, manic, in partial remission, anxiety disorder,

unspecified.



PLAN:  Continue current psychotropics.  She slept much better previous night as

compared to the night before when she slept just half an hour.  Remeron 7.5 mg

at bedtime was helpful with this.  We will maintain the Depakote, Risperdal,

melatonin for now along with Klonopin.





______________________________

MAN RADHA MANN MD



DR:  IKKI/domitila  JOB#:  [id number] / [id number]

DD:  11/14/2018 14:02  DT:  11/14/2018 21:15

## 2018-11-15 VITALS — DIASTOLIC BLOOD PRESSURE: 80 MMHG | SYSTOLIC BLOOD PRESSURE: 123 MMHG

## 2018-11-15 VITALS — DIASTOLIC BLOOD PRESSURE: 83 MMHG | SYSTOLIC BLOOD PRESSURE: 130 MMHG

## 2018-11-15 VITALS — SYSTOLIC BLOOD PRESSURE: 115 MMHG | DIASTOLIC BLOOD PRESSURE: 74 MMHG

## 2018-11-15 RX ADMIN — LISINOPRIL SCH MG: 20 TABLET ORAL at 09:44

## 2018-11-15 RX ADMIN — ACETAMINOPHEN SCH MG: 325 TABLET, FILM COATED ORAL at 09:43

## 2018-11-15 RX ADMIN — ACETAMINOPHEN SCH MG: 325 TABLET, FILM COATED ORAL at 19:32

## 2018-11-15 RX ADMIN — VITAMIN D, TAB 1000IU (100/BT) SCH UNIT: 25 TAB at 09:42

## 2018-11-15 RX ADMIN — Medication SCH EA: at 19:26

## 2018-11-15 RX ADMIN — BENZOCAINE AND MENTHOL PRN LOZ: 15; 3.6 LOZENGE ORAL at 20:01

## 2018-11-15 RX ADMIN — MELATONIN TAB 3 MG SCH MG: 3 TAB at 19:27

## 2018-11-15 RX ADMIN — LIDOCAINE SCH PATCH: 50 PATCH CUTANEOUS at 09:00

## 2018-11-15 RX ADMIN — TRAZODONE HYDROCHLORIDE SCH MG: 50 TABLET, FILM COATED ORAL at 19:27

## 2018-11-15 RX ADMIN — LIDOCAINE SCH PATCH: 50 PATCH CUTANEOUS at 09:44

## 2018-11-15 RX ADMIN — DOCUSATE SODIUM SCH MG: 100 CAPSULE, LIQUID FILLED ORAL at 19:27

## 2018-11-15 RX ADMIN — RISPERIDONE SCH MG: 0.5 TABLET ORAL at 19:27

## 2018-11-15 RX ADMIN — DIVALPROEX SODIUM SCH MG: 250 TABLET, FILM COATED, EXTENDED RELEASE ORAL at 19:27

## 2018-11-15 RX ADMIN — DOCUSATE SODIUM SCH MG: 100 CAPSULE, LIQUID FILLED ORAL at 09:42

## 2018-11-15 RX ADMIN — MULTIPLE VITAMINS W/ MINERALS TAB SCH TAB: TAB at 09:42

## 2018-11-15 RX ADMIN — MIRTAZAPINE SCH MG: 7.5 TABLET, FILM COATED ORAL at 19:26

## 2018-11-15 RX ADMIN — BENZOCAINE AND MENTHOL PRN LOZ: 15; 3.6 LOZENGE ORAL at 10:50

## 2018-11-15 NOTE — PDOC
Exam


Note:


Anjum Note:


Please also refer to the separate dictated note~for this date of service 

dictated separately.~Patient seen individually. Discussed the patient with 

Nursing staff reviewed the chart.~Reviewed interim history and current 

functioning. Reviewed vital signs,~Labs/ Radiology~and current medications 

noted below. Continue current treatment with the changes noted in the dictated 

addendum note





Assessment:


Vital Signs:





 Vital Signs








  Date Time  Temp Pulse Resp B/P (MAP) Pulse Ox O2 Delivery O2 Flow Rate FiO2


 


11/15/18 16:09 98.5 86 20 123/80 (94) 97 Room Air  








I&O











Intake and Output 


 


 11/15/18





 07:00


 


Intake Total 1280 ml


 


Balance 1280 ml


 


 


 


Intake Oral 1280 ml


 


# Voids 1











Current Medications:


Meds:





Current Medications


Acetaminophen (Tylenol) 650 mg PRN Q6HRS  PRN PO PAIN / TEMP Last administered 

on 11/12/18at 16:37;  Start 11/7/18 at 02:15


Multi-Ingredient Ointment (Analgesic Balm) 1 madalyn PRN QID  PRN TP MUSCLE PAIN 

Last administered on 11/8/18at 14:33;  Start 11/7/18 at 02:15


Al Hydroxide/Mg Hydroxide (Mylanta Plus Xs) 15 ml PRN AFTMEALHC  PRN PO 

DYSPEPSIA;  Start 11/7/18 at 02:15


Magnesium Hydroxide (Milk Of Magnesia) 2,400 mg PRN QHS  PRN PO CONSTIPATION;  

Start 11/7/18 at 02:15


Albuterol Sulfate (Ventolin Hfa Inhaler) 2 puff PRN Q4HRS  PRN INH SHORTNESS OF 

BREATH;  Start 11/7/18 at 09:45;  Stop 11/7/18 at 11:04;  Status DC


Clonazepam (KlonoPIN) 0.25 mg PRN DAILY  PRN PO ANXIETY Last administered on 11/ 14/18at 14:33;  Start 11/7/18 at 09:45


Vitamin D (Vitamin D3) 1,000 unit DAILY PO  Last administered on 11/15/18at 09:

42;  Start 11/8/18 at 09:00


Lisinopril (Prinivil) 20 mg DAILY PO  Last administered on 11/15/18at 09:44;  

Start 11/7/18 at 10:00


Melatonin 9 mg QHS PO  Last administered on 11/15/18at 19:27;  Start 11/7/18 at 

21:00


Multivitamins/ Calcium (Thera-M Plus) 1 tab DAILY PO  Last administered on 11/15

/18at 09:42;  Start 11/8/18 at 09:00


Quetiapine Fumarate (SEROquel) 25 mg QHS PO  Last administered on 11/7/18at 20:

45;  Start 11/7/18 at 10:00;  Stop 11/8/18 at 07:23;  Status DC


Acetaminophen (Tylenol) 500 mg BID PO  Last administered on 11/15/18at 19:32;  

Start 11/7/18 at 21:00


Hydrochlorothiazide (Microzide) 12.5 mg DAILY PO  Last administered on 11/11/ 18at 09:42;  Start 11/7/18 at 10:00;  Stop 11/11/18 at 22:59;  Status DC


Albuterol Sulfate (Ventolin) 2.5 mg PRN Q4HRS  PRN NEB SHORTNESS OF BREATH Last 

administered on 11/12/18at 01:41;  Start 11/7/18 at 11:00


Olanzapine (ZyPREXA ZYDIS) 2.5 mg PRN Q2HR  PRN PO PSYCHOSIS Last administered 

on 11/11/18at 04:55;  Start 11/7/18 at 19:30


Divalproex Sodium (Depakote Er) 500 mg DAILY PO  Last administered on 11/8/18at 

08:13;  Start 11/8/18 at 08:00;  Stop 11/8/18 at 19:16;  Status DC


Risperidone (RisperDAL) 0.25 mg DAILY PO  Last administered on 11/8/18at 08:11;

  Start 11/8/18 at 09:00;  Stop 11/8/18 at 19:16;  Status DC


Divalproex Sodium (Depakote Er) 500 mg QHS PO  Last administered on 11/10/18at 

19:18;  Start 11/9/18 at 18:45;  Stop 11/11/18 at 18:19;  Status DC


Risperidone (RisperDAL) 0.25 mg QHS PO ;  Start 11/9/18 at 21:00;  Stop 11/9/18 

at 21:00;  Status DC


Polyethylene Glycol (miraLAX) 17 gm PRN DAILY  PRN PO CONSTIPATION;  Start 11/8/ 18 at 21:15


Glycerin (Sani-Supp Adult) 1 supp PRN DAILY  PRN MN constipation;  Start 11/8/ 18 at 21:15


Lidocaine (Lidoderm) 1 patch DAILY TD  Last administered on 11/15/18at 09:44;  

Start 11/9/18 at 09:00


Miscellaneous (Lidoderm Patch Removal) 1 ea QHS MC  Last administered on 11/15/

18at 19:26;  Start 11/8/18 at 21:00


Risperidone (RisperDAL) 0.5 mg QHS PO  Last administered on 11/10/18at 19:18;  

Start 11/9/18 at 18:45;  Stop 11/11/18 at 20:14;  Status DC


Trazodone HCl (Desyrel) 50 mg QHS PO  Last administered on 11/15/18at 19:27;  

Start 11/9/18 at 21:00


Trazodone HCl (Desyrel) 50 mg PRN QHS  PRN PO INSOMNIA Last administered on 11/

10/18at 21:45;  Start 11/9/18 at 16:45


Docusate Sodium (Colace) 100 mg BID PO  Last administered on 11/15/18at 19:27;  

Start 11/9/18 at 18:45


Throat Lozenges (Cepacol Sore Throat Lozenge) 1 missy PRN Q2HR  PRN PO SORE 

THROAT Last administered on 11/15/18at 20:01;  Start 11/10/18 at 17:45


Sodium Chloride 1,000 ml @  500 mls/hr 1X  ONCE IV  Last administered on 11/11/ 18at 17:33;  Start 11/11/18 at 16:30;  Stop 11/11/18 at 18:29;  Status DC


Divalproex Sodium (Depakote Er) 750 mg QHS PO  Last administered on 11/15/18at 

19:27;  Start 11/11/18 at 21:00


Risperidone (RisperDAL) 0.25 mg QHS PO  Last administered on 11/11/18at 20:52;  

Start 11/11/18 at 21:00;  Stop 11/12/18 at 14:57;  Status DC


Lidocaine (Lidoderm) 1 patch DAILY TD ;  Start 11/12/18 at 09:00;  Stop 11/15/

18 at 14:43;  Status DC


Miscellaneous (Lidoderm Patch Removal) 1 ea QHS MC  Last administered on 11/14/ 18at 19:17;  Start 11/11/18 at 21:00;  Stop 11/15/18 at 14:44;  Status DC


Sodium Chloride 1,000 ml @  150 mls/hr Q6H40M IV  Last administered on 11/11/ 18at 23:18;  Start 11/11/18 at 23:00;  Stop 11/12/18 at 05:40;  Status DC


Guaifenesin (Mucinex Er) 600 mg BID PO  Last administered on 11/15/18at 19:27;  

Start 11/12/18 at 21:00;  Stop 11/19/18 at 20:59


Risperidone (RisperDAL) 0.75 mg QHS PO  Last administered on 11/14/18at 19:23;  

Start 11/12/18 at 21:00;  Stop 11/15/18 at 10:53;  Status DC


Mirtazapine (Remeron) 7.5 mg QHS PO  Last administered on 11/15/18at 19:26;  

Start 11/12/18 at 21:00


Risperidone (RisperDAL) 0.5 mg QHS PO ;  Start 11/15/18 at 11:15;  Stop 11/15/

18 at 18:01;  Status DC


Risperidone (RisperDAL) 0.5 mg QHS PO  Last administered on 11/15/18at 19:27;  

Start 11/15/18 at 21:00





Active Scripts


Active


Reported


Centrum Adults Tablet (Multivitamin/Iron/Folic Acid) 1 Each Tablet 1 Each PO 

DAILY


Vitamin D (Cholecalciferol (Vitamin D3)) 1,000 Unit Capsule 1,000 Unit PO DAILY


Proair Hfa Inhaler (Albuterol Sulfate) 8.5 Gm Hfa.aer.ad 2 Puff INH PRN Q4HRS 

PRN


Tylenol (Acetaminophen) 325 Mg Tablet 500 Mg PO BID


Clonazepam 0.5 Mg Tablet 0.25 Mg PO PRN DAILY PRN


Melatonin 3 Mg Tablet 10 Mg PO QHS


Seroquel (Quetiapine Fumarate) 25 Mg Tablet 25 Mg PO QHS


Lisinopril-Hctz 20-12.5 Mg Tab (Lisinopril/Hydrochlorothiazide) 1 Each Tablet 1 

Tab PO DAILY


I have reviewed the current psychotropics carefully including drug 

interactions.  Risk benefit ratio favors no change other than as noted in my 

dictated progress note.





Diagnosis:


Problems:  


(1) Anxiety disorder


(2) Bipolar affective, mixed, sev w/ psych


(3) Impulse control disorder











SOFÍA MANN MD Nov 15, 2018 23:02

## 2018-11-16 VITALS — DIASTOLIC BLOOD PRESSURE: 90 MMHG | SYSTOLIC BLOOD PRESSURE: 140 MMHG

## 2018-11-16 VITALS — DIASTOLIC BLOOD PRESSURE: 79 MMHG | SYSTOLIC BLOOD PRESSURE: 138 MMHG

## 2018-11-16 RX ADMIN — Medication SCH EA: at 19:20

## 2018-11-16 RX ADMIN — MELATONIN TAB 3 MG SCH MG: 3 TAB at 19:20

## 2018-11-16 RX ADMIN — ACETAMINOPHEN SCH MG: 325 TABLET, FILM COATED ORAL at 09:28

## 2018-11-16 RX ADMIN — DOCUSATE SODIUM SCH MG: 100 CAPSULE, LIQUID FILLED ORAL at 19:20

## 2018-11-16 RX ADMIN — ACETAMINOPHEN PRN MG: 325 TABLET, FILM COATED ORAL at 23:39

## 2018-11-16 RX ADMIN — ACETAMINOPHEN SCH MG: 325 TABLET, FILM COATED ORAL at 19:20

## 2018-11-16 RX ADMIN — RISPERIDONE SCH MG: 0.5 TABLET ORAL at 19:20

## 2018-11-16 RX ADMIN — LIDOCAINE SCH PATCH: 50 PATCH CUTANEOUS at 09:31

## 2018-11-16 RX ADMIN — DOCUSATE SODIUM SCH MG: 100 CAPSULE, LIQUID FILLED ORAL at 09:27

## 2018-11-16 RX ADMIN — DIVALPROEX SODIUM SCH MG: 250 TABLET, FILM COATED, EXTENDED RELEASE ORAL at 19:20

## 2018-11-16 RX ADMIN — LISINOPRIL SCH MG: 20 TABLET ORAL at 09:29

## 2018-11-16 RX ADMIN — BENZOCAINE AND MENTHOL PRN LOZ: 15; 3.6 LOZENGE ORAL at 23:39

## 2018-11-16 RX ADMIN — VITAMIN D, TAB 1000IU (100/BT) SCH UNIT: 25 TAB at 09:27

## 2018-11-16 RX ADMIN — TRAZODONE HYDROCHLORIDE SCH MG: 50 TABLET, FILM COATED ORAL at 19:20

## 2018-11-16 RX ADMIN — MULTIPLE VITAMINS W/ MINERALS TAB SCH TAB: TAB at 09:29

## 2018-11-16 RX ADMIN — MIRTAZAPINE SCH MG: 7.5 TABLET, FILM COATED ORAL at 19:20

## 2018-11-16 RX ADMIN — BENZONATATE SCH MG: 100 CAPSULE, LIQUID FILLED ORAL at 23:39

## 2018-11-16 NOTE — PN
DATE:  11/15/2018



This is a late entry for 11/15/2018 covers elements not covered in my initial

note.



SUBJECTIVE:  I met with the patient in the evening together with her  and

also in the morning at a treatment team meeting.   attended the treatment

team meeting as well.  We had a lengthy discussion about the patient's

diagnosis.  CT head additionally shows some atrophy reflective of some cognitive

deficits, but primary diagnosis does seem to be bipolar disorder at least from

what information and observations we have had so far.  I reviewed at length with

her  circumstances prompting admission, the physician reviewer for her

insurance, but denied inpatient hospitalization because he felt the patient

could go to intensive outpatient.   was very clear that the intensive

outpatient was not a practical option for the patient.  There is no one to

transport the patient.   works long hours and the patient could not be

left by herself given her sujey, psychotic symptoms, which were quite

significant at the time of admission.   wanted me to make a notation in

the chart clarifying this for later review and this note should document this. 

In preparation for transition to outpatient treatment,  has made

outpatient psychiatric followup with Dr. Corona at [facility] in [place].



REVIEW OF SYSTEMS:  Positive for URI symptoms.  No CV, , GI, eye system

symptoms on review.



MENTAL STATUS EXAM:  The patient is oriented to herself and situation.  Speech

still somewhat rapid, coherent, abstraction fair, computation impaired, language

function intact.  She slept 8-1/2 hours previous night, which is quite an

improvement for her and reflective of positive response to treatment since she

is having significant insomnia initially.  Nevertheless, she still remains

somewhat hypomanic.



MENTAL STATUS EXAM:  Oriented to herself and situation.  Speech is somewhat

coherent, rapid.  Attention span short.  Language function intact.  No active

suicidal or homicidal ideation.



LABORATORY DATA:  Reviewed.



IMPRESSION:  Bipolar 1 disorder, manic, in partial remission; anxiety disorder,

unspecified; cognitive disorder, unspecified.



PLAN:  Continue Remeron 7.5 mg p.o. at bedtime, Risperdal is 0.75 mg p.o. at

bedtime.  Given the risk/benefit ratio and the fact that Depakote level is

therapeutic, we will drop it down to 0.5 at bedtime despite her above symptoms. 

Rest unchanged from initial note.  Possible transition home to outpatient

treatment over the weekend or early next week.





______________________________

MAN RADHA MANN MD



DR:  Breana  JOB#:  [id number] / [id number]

DD:  11/16/2018 07:54  DT:  11/16/2018 21:32

## 2018-11-16 NOTE — PN
DATE:  11/14/2018



This is a late entry for 11/14/2018 covers elements not covered in my initial

note.



SUBJECTIVE:  I met with the patient in the evening.  The patient slept 5-3/4

hours previous night.  She remains somewhat anxious, hypomanic, constantly

requesting Mucinex, wanting to write something with persistent hypergraphia. 

She does have symptoms of URI, sore throat, will defer to Dr. Wen.  Chest x-ray

was done.  Valproic acid level therapeutic at 64.  AST and ALT unremarkable.



REVIEW OF SYSTEMS:  Positive for URI symptoms, tiredness.  No CV, , GI, eye

system symptoms on review.



MENTAL STATUS EXAM:  Oriented to herself and situation.  Speech coherent, rapid

at times.  Abstraction fair, computation impaired, language function intact. 

Attention span short.  She remains somewhat hypomanic.  No suicidal or homicidal

ideation.



LABORATORY DATA:  Reviewed as noted.



IMPRESSION:  Bipolar 1 disorder, manic with psychotic features, in partial

remission; cognitive disorder, unspecified; attention span is very short,

distractible, unable to sustain concentration enough to complete a train of

thought, but despite this, she is improved from how she was.



PLAN:  No change from initial note.  Continue Risperdal, Depakote at current

dosage.  Seroquel was stopped.





______________________________

MAN RADHA MANN MD



DR:  KIKI/domitila  JOB#:  [id number] / [id number]

DD:  11/16/2018 07:49  DT:  11/16/2018 21:24

## 2018-11-17 VITALS — SYSTOLIC BLOOD PRESSURE: 142 MMHG | DIASTOLIC BLOOD PRESSURE: 80 MMHG

## 2018-11-17 VITALS — DIASTOLIC BLOOD PRESSURE: 88 MMHG | SYSTOLIC BLOOD PRESSURE: 138 MMHG

## 2018-11-17 RX ADMIN — LIDOCAINE SCH PATCH: 50 PATCH CUTANEOUS at 09:00

## 2018-11-17 RX ADMIN — TRAZODONE HYDROCHLORIDE SCH MG: 50 TABLET, FILM COATED ORAL at 19:28

## 2018-11-17 RX ADMIN — ACETAMINOPHEN SCH MG: 500 TABLET ORAL at 19:31

## 2018-11-17 RX ADMIN — ACETAMINOPHEN PRN MG: 325 TABLET, FILM COATED ORAL at 19:28

## 2018-11-17 RX ADMIN — LISINOPRIL SCH MG: 20 TABLET ORAL at 09:16

## 2018-11-17 RX ADMIN — Medication SCH EA: at 19:30

## 2018-11-17 RX ADMIN — LIDOCAINE SCH PATCH: 50 PATCH CUTANEOUS at 09:19

## 2018-11-17 RX ADMIN — BENZONATATE SCH MG: 100 CAPSULE, LIQUID FILLED ORAL at 14:02

## 2018-11-17 RX ADMIN — DIVALPROEX SODIUM SCH MG: 250 TABLET, FILM COATED, EXTENDED RELEASE ORAL at 19:28

## 2018-11-17 RX ADMIN — MELATONIN TAB 3 MG SCH MG: 3 TAB at 19:28

## 2018-11-17 RX ADMIN — DOCUSATE SODIUM SCH MG: 100 CAPSULE, LIQUID FILLED ORAL at 09:16

## 2018-11-17 RX ADMIN — MULTIPLE VITAMINS W/ MINERALS TAB SCH TAB: TAB at 09:16

## 2018-11-17 RX ADMIN — VITAMIN D, TAB 1000IU (100/BT) SCH UNIT: 25 TAB at 09:19

## 2018-11-17 RX ADMIN — RISPERIDONE SCH MG: 0.5 TABLET ORAL at 19:28

## 2018-11-17 RX ADMIN — MIRTAZAPINE SCH MG: 7.5 TABLET, FILM COATED ORAL at 19:28

## 2018-11-17 RX ADMIN — DOCUSATE SODIUM SCH MG: 100 CAPSULE, LIQUID FILLED ORAL at 19:28

## 2018-11-17 RX ADMIN — ACETAMINOPHEN SCH MG: 325 TABLET, FILM COATED ORAL at 09:17

## 2018-11-17 RX ADMIN — BENZONATATE SCH MG: 100 CAPSULE, LIQUID FILLED ORAL at 19:28

## 2018-11-17 RX ADMIN — BENZONATATE SCH MG: 100 CAPSULE, LIQUID FILLED ORAL at 09:16

## 2018-11-17 NOTE — PN
DATE:  11/17/2018



SUBJECTIVE:  The patient was seen today, met with the staff, chart reviewed. 

The patient is attention seeking, repetitive, obsessive compulsive, wanting to

see the doctor everyday and having multiple physical complaints.  The patient is

still anxious, restless, and talkative.  The patient at times reasonable.  The

patient is able to comprehend the surroundings fairly well.  The patient

sometimes becomes very defensive angry, agitated.  The patient has fairly good

long-term memory and also she is able to keep up with the current events.  Her

memory fluctuates a lot.



OBSERVATION:

VITAL SIGNS:  Temperature 98, blood pressure 142/80, pulse 90, respiration 20,

and O2 sat 96%, slept about 5 hours last night.



CURRENT MEDICATIONS:  Include Risperdal 0.5 mg daily, mirtazapine 7.5 mg at

night, Depakote 750 mg at night, trazodone 50 mg at night, and melatonin 9 mg at

night.  The patient is able to walk.  No falls.



ASSESSMENT:

1.  Bipolar disorder, type 1, sujey versus mixed state with psychotic features.

2.  Anxiety disorder.

3.  Impulse control disorder, unspecified.



PLAN:  The patient continued to show improvement.  She is fine for discharge on

11/19/2018.





______________________________

LUCILA GAY MD



DR:  EV/domitila  JOB#:  [id number] / [id number]

DD:  11/17/2018 16:24  DT:  11/17/2018 20:22

## 2018-11-17 NOTE — PN
DATE:  11/16/2018



SUBJECTIVE:  The patient was seen today, met with the staff, chart reviewed and

also covering for Dr. Cuadra.  The patient continues to be anxious, nervous and

also fearful.  She is afraid somebody might walking.  The patient is mainly

having difficulty with short-term memory, history of functioning high level of

anxiety.



OBSERVATION:

VITAL SIGNS:  Temperature 98.4, blood pressure 138/79, pulse 88, respirations

24, O2 sat 99%.  Slept about 6 hours last night.



MEDICATIONS:  Reviewed.  Currently on Risperdal 0.5 mg at night, mirtazapine 7.5

mg at night, Depakote 750 mg at night, trazodone 50 mg at night and also p.r.n.

for sleep, melatonin 9 mg at night.  The patient is not having any other medical

issues.  The patient is able to walk.



ASSESSMENT:

1.  Bipolar disorder type 1, manic versus mixed with psychotic features.

2.  Anxiety disorder.

3.  Impulse control disorder, unspecified.



PLAN:  The patient will continue on these medications.  Current discharge will

be discussed in the next staffing with the _____, is able to take care of her

needs.





______________________________

RICARDO HAN MD



DR:  EV/domitila  JOB#:  [id number] / [id number]

DD:  11/16/2018 19:16  DT:  11/17/2018 01:49

## 2018-11-18 VITALS — DIASTOLIC BLOOD PRESSURE: 78 MMHG | SYSTOLIC BLOOD PRESSURE: 119 MMHG

## 2018-11-18 VITALS — DIASTOLIC BLOOD PRESSURE: 71 MMHG | SYSTOLIC BLOOD PRESSURE: 107 MMHG

## 2018-11-18 RX ADMIN — LISINOPRIL SCH MG: 20 TABLET ORAL at 09:36

## 2018-11-18 RX ADMIN — BENZONATATE SCH MG: 100 CAPSULE, LIQUID FILLED ORAL at 20:19

## 2018-11-18 RX ADMIN — MELATONIN TAB 3 MG SCH MG: 3 TAB at 20:19

## 2018-11-18 RX ADMIN — RISPERIDONE SCH MG: 0.5 TABLET ORAL at 20:19

## 2018-11-18 RX ADMIN — AMOXICILLIN AND CLAVULANATE POTASSIUM SCH TAB: 875; 125 TABLET, FILM COATED ORAL at 12:00

## 2018-11-18 RX ADMIN — MULTIPLE VITAMINS W/ MINERALS TAB SCH TAB: TAB at 09:46

## 2018-11-18 RX ADMIN — BENZONATATE SCH MG: 100 CAPSULE, LIQUID FILLED ORAL at 14:14

## 2018-11-18 RX ADMIN — ACETAMINOPHEN SCH MG: 500 TABLET ORAL at 09:46

## 2018-11-18 RX ADMIN — VITAMIN D, TAB 1000IU (100/BT) SCH UNIT: 25 TAB at 09:36

## 2018-11-18 RX ADMIN — DOCUSATE SODIUM SCH MG: 100 CAPSULE, LIQUID FILLED ORAL at 20:19

## 2018-11-18 RX ADMIN — ACETAMINOPHEN PRN MG: 325 TABLET, FILM COATED ORAL at 04:21

## 2018-11-18 RX ADMIN — AMOXICILLIN AND CLAVULANATE POTASSIUM SCH TAB: 875; 125 TABLET, FILM COATED ORAL at 20:19

## 2018-11-18 RX ADMIN — Medication SCH CAP: at 20:20

## 2018-11-18 RX ADMIN — LIDOCAINE SCH PATCH: 50 PATCH CUTANEOUS at 09:36

## 2018-11-18 RX ADMIN — BENZOCAINE AND MENTHOL PRN LOZ: 15; 3.6 LOZENGE ORAL at 20:36

## 2018-11-18 RX ADMIN — DIVALPROEX SODIUM SCH MG: 250 TABLET, FILM COATED, EXTENDED RELEASE ORAL at 20:19

## 2018-11-18 RX ADMIN — Medication SCH EA: at 20:20

## 2018-11-18 RX ADMIN — ACETAMINOPHEN SCH MG: 500 TABLET ORAL at 20:19

## 2018-11-18 RX ADMIN — Medication PRN APP: at 09:38

## 2018-11-18 RX ADMIN — MIRTAZAPINE SCH MG: 7.5 TABLET, FILM COATED ORAL at 20:19

## 2018-11-18 RX ADMIN — BENZONATATE SCH MG: 100 CAPSULE, LIQUID FILLED ORAL at 09:36

## 2018-11-18 RX ADMIN — DOCUSATE SODIUM SCH MG: 100 CAPSULE, LIQUID FILLED ORAL at 09:35

## 2018-11-18 RX ADMIN — TRAZODONE HYDROCHLORIDE SCH MG: 50 TABLET, FILM COATED ORAL at 20:19

## 2018-11-18 NOTE — RAD
CLINICAL HISTORY: congestion  

 

COMPARISON: None available.

 

TECHNIQUE: CT of the facial bones was performed without IV contrast. Axial

coronal and sagittal reformatted images were generated.

 

FINDINGS:

 

No definite fracture is noted of the facial bones.

 

There is patchy opacification of the maxillary sinus with associated 

air-fluid levels. There is also patchy opacification of the ethmoid air 

cells.

 

Associated maxillary wall thickening and remodeling is seen.

 

No evidence of air-fluid levels. 

 

The mastoids are unremarkable.

 

The globes, extraocular muscles, optic nerves and retrobulbar fat are 

normal. 

 

Visualized upper aerodigestive tract is normal.

 

Mandible and bilateral temporomandibular joints are normal. 

 

IMPRESSION:

1.  Patchy maxillary sinus and ethmoid air cell opacification with 

air-fluid levels and associated bony changes may be seen with acute on 

chronic sinusitis.

2.  No evidence for acute facial bone fracture.

 

 

 

 

 

 

 

Electronically signed by: Velasquez Ye MD (11/18/2018 9:07 AM) [facility]

## 2018-11-18 NOTE — DS
DATE OF DISCHARGE:  11/18/2018



FINAL DIAGNOSES:

AXIS I:

1.  Bipolar disorder type 1, manic with psychotic features.

2.  Anxiety disorder, unspecified.

3.  Impulse control disorder, unspecified.

4.  Cognitive disorder, unspecified.

AXIS II:  None.

AXIS III:  Chronic obstructive pulmonary disease, hypertension, hyponatremia,

osteoarthritis, chronic pain, hypercholesterolemia, spinal stenosis, asthma,

chronic venous insufficiency.



REASON FOR ADMISSION:  This 72-year-old  female who was admitted to

[facility] at [facility] from [facility] where she presented with the family

because of manic behaviors and psychosis.  The patient apparently has been

having this problem for quite some time.  Apparently, she failed outpatient

psychiatric interventions.



HISTORY OF PRESENT ILLNESS:  She has a long history of depression, panic

attacks, anxiety, ADHD and also significant mood swings and periods of severe

depression.  Also, exhibited variation in her sleep and appetite.  She is also

very obsessive.  She is a perfectionist, but she is not able to keep up with the

chores at home.  Left whole house disorganized.



HOSPITAL COURSE:  The patient had a physical exam, routine lab work including

CBC, chem profile and urinalysis.  The patient's labs have been in normal range

except for sodium of ____, hemoglobin A1c 5.6, AST 62 and also , HDL 54. 

The patient was involved in the program including individual therapy, group

therapy and milieu therapy.  The patient was continued on her medications

including Risperdal 0.5 mg at night, mirtazapine 7.5 mg at night, Depakote 750

mg at night.  She is also on lisinopril 20 mg daily.  She is also on p.r.n.

Zyprexa 2.5 mg. q. 2 hours p.r.n.  The patient apparently did fairly well during

her stay here.  She is not present with any major problems except being very

anxious, nervous and tearful at times, and also some paranoid ideation.



AFTERCARE PLAN:  The patient will be returning home and she will continue on the

current medications.  Also, continue to see her primary care doctor and also

psychiatrist.





______________________________

RICARDO HAN MD



DR:  EV/domitila  JOB#:  [id number] / [id number]

DD:  11/18/2018 17:18  DT:  11/18/2018 17:47

## 2018-11-19 VITALS — DIASTOLIC BLOOD PRESSURE: 72 MMHG | SYSTOLIC BLOOD PRESSURE: 109 MMHG

## 2018-11-19 RX ADMIN — BENZONATATE SCH MG: 100 CAPSULE, LIQUID FILLED ORAL at 09:27

## 2018-11-19 RX ADMIN — VITAMIN D, TAB 1000IU (100/BT) SCH UNIT: 25 TAB at 09:27

## 2018-11-19 RX ADMIN — LIDOCAINE SCH PATCH: 50 PATCH CUTANEOUS at 09:27

## 2018-11-19 RX ADMIN — BENZOCAINE AND MENTHOL PRN LOZ: 15; 3.6 LOZENGE ORAL at 09:31

## 2018-11-19 RX ADMIN — MULTIPLE VITAMINS W/ MINERALS TAB SCH TAB: TAB at 09:27

## 2018-11-19 RX ADMIN — ACETAMINOPHEN SCH MG: 500 TABLET ORAL at 09:27

## 2018-11-19 RX ADMIN — Medication SCH CAP: at 09:27

## 2018-11-19 RX ADMIN — DOCUSATE SODIUM SCH MG: 100 CAPSULE, LIQUID FILLED ORAL at 09:27

## 2018-11-19 RX ADMIN — AMOXICILLIN AND CLAVULANATE POTASSIUM SCH TAB: 875; 125 TABLET, FILM COATED ORAL at 09:26
